# Patient Record
Sex: MALE | Race: WHITE | NOT HISPANIC OR LATINO
[De-identification: names, ages, dates, MRNs, and addresses within clinical notes are randomized per-mention and may not be internally consistent; named-entity substitution may affect disease eponyms.]

---

## 2017-11-14 ENCOUNTER — APPOINTMENT (OUTPATIENT)
Dept: ORTHOPEDIC SURGERY | Facility: CLINIC | Age: 62
End: 2017-11-14
Payer: MEDICAID

## 2017-11-14 VITALS
SYSTOLIC BLOOD PRESSURE: 186 MMHG | DIASTOLIC BLOOD PRESSURE: 98 MMHG | BODY MASS INDEX: 40.82 KG/M2 | HEIGHT: 65 IN | HEART RATE: 111 BPM | WEIGHT: 245 LBS

## 2017-11-14 DIAGNOSIS — Z78.9 OTHER SPECIFIED HEALTH STATUS: ICD-10-CM

## 2017-11-14 DIAGNOSIS — Z87.39 PERSONAL HISTORY OF OTHER DISEASES OF THE MUSCULOSKELETAL SYSTEM AND CONNECTIVE TISSUE: ICD-10-CM

## 2017-11-14 DIAGNOSIS — Z80.41 FAMILY HISTORY OF MALIGNANT NEOPLASM OF OVARY: ICD-10-CM

## 2017-11-14 DIAGNOSIS — Z80.2 FAMILY HISTORY OF MALIGNANT NEOPLASM OF OTHER RESPIRATORY AND INTRATHORACIC ORGANS: ICD-10-CM

## 2017-11-14 DIAGNOSIS — Z56.0 UNEMPLOYMENT, UNSPECIFIED: ICD-10-CM

## 2017-11-14 DIAGNOSIS — Z87.442 PERSONAL HISTORY OF URINARY CALCULI: ICD-10-CM

## 2017-11-14 DIAGNOSIS — Z86.79 PERSONAL HISTORY OF OTHER DISEASES OF THE CIRCULATORY SYSTEM: ICD-10-CM

## 2017-11-14 DIAGNOSIS — Z80.3 FAMILY HISTORY OF MALIGNANT NEOPLASM OF BREAST: ICD-10-CM

## 2017-11-14 PROCEDURE — 73030 X-RAY EXAM OF SHOULDER: CPT | Mod: RT

## 2017-11-14 PROCEDURE — 20610 DRAIN/INJ JOINT/BURSA W/O US: CPT | Mod: RT

## 2017-11-14 PROCEDURE — 99204 OFFICE O/P NEW MOD 45 MIN: CPT | Mod: 25

## 2017-11-14 RX ORDER — GABAPENTIN 300 MG
300 TABLET ORAL
Refills: 0 | Status: ACTIVE | COMMUNITY

## 2017-11-14 SDOH — ECONOMIC STABILITY - INCOME SECURITY: UNEMPLOYMENT, UNSPECIFIED: Z56.0

## 2019-02-25 ENCOUNTER — APPOINTMENT (OUTPATIENT)
Dept: UROLOGY | Facility: CLINIC | Age: 64
End: 2019-02-25
Payer: MEDICAID

## 2019-02-25 VITALS
BODY MASS INDEX: 42.49 KG/M2 | SYSTOLIC BLOOD PRESSURE: 130 MMHG | HEIGHT: 65 IN | HEART RATE: 108 BPM | DIASTOLIC BLOOD PRESSURE: 86 MMHG | TEMPERATURE: 98.2 F | WEIGHT: 255 LBS | OXYGEN SATURATION: 98 % | RESPIRATION RATE: 16 BRPM

## 2019-02-25 DIAGNOSIS — M75.41 IMPINGEMENT SYNDROME OF RIGHT SHOULDER: ICD-10-CM

## 2019-02-25 PROCEDURE — 99214 OFFICE O/P EST MOD 30 MIN: CPT

## 2019-02-25 RX ORDER — LEVOTHYROXINE SODIUM 0.03 MG/1
25 TABLET ORAL
Refills: 0 | Status: DISCONTINUED | COMMUNITY
End: 2019-02-25

## 2019-02-25 RX ORDER — LOSARTAN POTASSIUM 50 MG/1
50 TABLET, FILM COATED ORAL
Refills: 0 | Status: ACTIVE | COMMUNITY

## 2019-02-25 RX ORDER — LEVOTHYROXINE SODIUM 25 UG/1
25 TABLET ORAL
Refills: 0 | Status: ACTIVE | COMMUNITY

## 2019-02-25 NOTE — LETTER BODY
[Dear  ___] : Dear  [unfilled], [Consult Letter:] : I had the pleasure of evaluating your patient, [unfilled]. [Please see my note below.] : Please see my note below. [FreeTextEntry1] : \par \par Address: [address], [place] [address]\par \par \par \par \par Phone: [phone]

## 2019-02-25 NOTE — ASSESSMENT
[FreeTextEntry1] : PSA level was normal. His urinary symptoms are not significant enough at this time to consider alpha blocker therapy. He will undergo followup renal ultrasound to kidney stones. In 2016, 24-hour urine collection results were quite satisfactory and hopefully he has continued to the same diet. Renal stone diet was discussed again. He can followup in one year.\par \par Mikel Dockery MD, FACS\par [facility]\par  of Urology\par \par [address], [address]\[place] [address]\par \par [address], [address]\[place] [address]\par \par Tel: [phone]\par Fax: [phone]

## 2019-02-25 NOTE — HISTORY OF PRESENT ILLNESS
[FreeTextEntry1] : He is a 63 year-old man who is seen today in followup. He was last seen in 2016 for kidney stones. He does not have significant nocturia. Urinary flow is slow sometimes. Erectile function is intermittent but he is not bothered by it. Residual urine today was 40 cc. In December 2018, PSA level was 2.1. Ultrasound in July 2016 did not show any remaining kidney stones. Also in 2016, he underwent metabolic workup with 24-hour urine collection which showed urine volume 2.57 L, calcium 129, oxalate 49, citrate 456, urinary pH 6.4 and uric acid 0.68, the majority of which are normal results.\par Previous notes:He underwent right shock wave lithotripsy in April 2016 and stent placement for a radiolucent stone, 1.2 cm. Stent has been removed. He stopped taking potassium citrate.\par CT on 10/2015 showed a 7 mm stone in the right renal pelvis just above the right UPJ. There were no other stones. There was a 9 mm cyst in the upper pole of the left kidney. Since stone was not seen on KUB, he was placed on potassium citrate.

## 2019-02-25 NOTE — PHYSICAL EXAM
[General Appearance - Well Developed] : well developed [General Appearance - Well Nourished] : well nourished [Normal Appearance] : normal appearance [Well Groomed] : well groomed [General Appearance - In No Acute Distress] : no acute distress [Abdomen Soft] : soft [Abdomen Tenderness] : non-tender [Costovertebral Angle Tenderness] : no ~M costovertebral angle tenderness [FreeTextEntry1] : obese [Urethral Meatus] : meatus normal [Penis Abnormality] : normal circumcised penis [Urinary Bladder Findings] : the bladder was normal on palpation [Scrotum] : the scrotum was normal [Epididymis] : the epididymides were normal [Testes Tenderness] : no tenderness of the testes [Testes Mass (___cm)] : there were no testicular masses [Prostate Enlargement] : the prostate was not enlarged [Prostate Tenderness] : the prostate was not tender [No Prostate Nodules] : no prostate nodules [] : no respiratory distress [Respiration, Rhythm And Depth] : normal respiratory rhythm and effort [Exaggerated Use Of Accessory Muscles For Inspiration] : no accessory muscle use [Oriented To Time, Place, And Person] : oriented to person, place, and time [Affect] : the affect was normal [Mood] : the mood was normal [Not Anxious] : not anxious

## 2019-02-27 ENCOUNTER — FORM ENCOUNTER (OUTPATIENT)
Age: 64
End: 2019-02-27

## 2019-02-28 ENCOUNTER — OUTPATIENT (OUTPATIENT)
Dept: OUTPATIENT SERVICES | Facility: HOSPITAL | Age: 64
LOS: 1 days | End: 2019-02-28
Payer: MEDICAID

## 2019-02-28 ENCOUNTER — APPOINTMENT (OUTPATIENT)
Dept: ULTRASOUND IMAGING | Facility: CLINIC | Age: 64
End: 2019-02-28
Payer: MEDICAID

## 2019-02-28 DIAGNOSIS — Z98.89 OTHER SPECIFIED POSTPROCEDURAL STATES: Chronic | ICD-10-CM

## 2019-02-28 DIAGNOSIS — Z00.8 ENCOUNTER FOR OTHER GENERAL EXAMINATION: ICD-10-CM

## 2019-02-28 DIAGNOSIS — N20.0 CALCULUS OF KIDNEY: ICD-10-CM

## 2019-02-28 PROCEDURE — 76775 US EXAM ABDO BACK WALL LIM: CPT | Mod: 26

## 2019-02-28 PROCEDURE — 76775 US EXAM ABDO BACK WALL LIM: CPT

## 2019-02-28 PROCEDURE — 76770 US EXAM ABDO BACK WALL COMP: CPT | Mod: 26

## 2021-04-28 ENCOUNTER — APPOINTMENT (OUTPATIENT)
Dept: UROLOGY | Facility: CLINIC | Age: 66
End: 2021-04-28
Payer: MEDICARE

## 2021-04-28 VITALS
BODY MASS INDEX: 42.49 KG/M2 | WEIGHT: 255 LBS | SYSTOLIC BLOOD PRESSURE: 162 MMHG | HEIGHT: 65 IN | TEMPERATURE: 98.1 F | DIASTOLIC BLOOD PRESSURE: 73 MMHG | HEART RATE: 77 BPM | RESPIRATION RATE: 15 BRPM | OXYGEN SATURATION: 96 %

## 2021-04-28 PROCEDURE — 99072 ADDL SUPL MATRL&STAF TM PHE: CPT

## 2021-04-28 PROCEDURE — 99213 OFFICE O/P EST LOW 20 MIN: CPT

## 2021-04-28 NOTE — PHYSICAL EXAM
[General Appearance - Well Developed] : well developed [General Appearance - Well Nourished] : well nourished [Normal Appearance] : normal appearance [Well Groomed] : well groomed [General Appearance - In No Acute Distress] : no acute distress [Abdomen Soft] : soft [Abdomen Tenderness] : non-tender [Costovertebral Angle Tenderness] : no ~M costovertebral angle tenderness [Urethral Meatus] : meatus normal [Penis Abnormality] : normal circumcised penis [Urinary Bladder Findings] : the bladder was normal on palpation [Scrotum] : the scrotum was normal [Epididymis] : the epididymides were normal [Testes Tenderness] : no tenderness of the testes [Testes Mass (___cm)] : there were no testicular masses [Prostate Tenderness] : the prostate was not tender [Prostate Enlargement] : the prostate was not enlarged [No Prostate Nodules] : no prostate nodules

## 2021-04-28 NOTE — LETTER BODY
[Dear  ___] : Dear  [unfilled], [Please see my note below.] : Please see my note below. [Consult Letter:] : I had the pleasure of evaluating your patient, [unfilled]. [FreeTextEntry1] : Address: [address], [place] [address]\par Phone: [phone]

## 2021-04-28 NOTE — ASSESSMENT
[FreeTextEntry1] : PSA level is within normal range.  He is not significantly bothered by urinary symptoms.  Residual urine volume was minimal.  Recommended to have a repeat ultrasound but patient says that he will be seeing nephrology soon who will probably perform his renal ultrasound.  He will try to send me copy of it.  He can follow-up in 1 year.\par \par Mikel Dockery MD, FACS\par [facility]\par  of Urology\par \par [address], [address]\[place] [address]\par \par [address], [address]\[place] [address]\par \par Tel: [phone]\par Fax: [phone]

## 2021-04-28 NOTE — HISTORY OF PRESENT ILLNESS
[FreeTextEntry1] : He is a 65 year-old man who is seen today in followup.  He was seen about 2 years ago.  He generally does not have significant urinary symptoms.  Nocturia is 1 time.  There is no flank pain.  Ultrasound in 2019 showed no recurrent kidney stones.  Residual urine today was 50 cc.  PSA level was 2.3 in April 2021.  He is supposed to see nephrology soon.  He only has a slow urinary stream in the morning.\par \par Erectile function is intermittent but he is not bothered by it. In December 2018, PSA level was 2.1. Ultrasound in July 2016 did not show any remaining kidney stones. Also in 2016, he underwent metabolic workup with 24-hour urine collection which showed urine volume 2.57 L, calcium 129, oxalate 49, citrate 456, urinary pH 6.4 and uric acid 0.68, the majority of which are normal results.\par Previous notes: He underwent right shock wave lithotripsy in April 2016 and stent placement for a radiolucent stone, 1.2 cm. Stent has been removed. He stopped taking potassium citrate.\par CT on 10/2015 showed a 7 mm stone in the right renal pelvis just above the right UPJ. There were no other stones. There was a 9 mm cyst in the upper pole of the left kidney. Since stone was not seen on KUB, he was placed on potassium citrate.

## 2021-07-01 ENCOUNTER — NON-APPOINTMENT (OUTPATIENT)
Age: 66
End: 2021-07-01

## 2021-07-07 ENCOUNTER — OUTPATIENT (OUTPATIENT)
Dept: OUTPATIENT SERVICES | Facility: HOSPITAL | Age: 66
LOS: 1 days | End: 2021-07-07
Payer: MEDICARE

## 2021-07-07 VITALS
DIASTOLIC BLOOD PRESSURE: 75 MMHG | TEMPERATURE: 98 F | HEART RATE: 66 BPM | SYSTOLIC BLOOD PRESSURE: 164 MMHG | RESPIRATION RATE: 16 BRPM | WEIGHT: 255.07 LBS | OXYGEN SATURATION: 96 % | HEIGHT: 65 IN

## 2021-07-07 VITALS
HEART RATE: 66 BPM | SYSTOLIC BLOOD PRESSURE: 113 MMHG | OXYGEN SATURATION: 98 % | DIASTOLIC BLOOD PRESSURE: 52 MMHG | RESPIRATION RATE: 28 BRPM

## 2021-07-07 DIAGNOSIS — Z98.89 OTHER SPECIFIED POSTPROCEDURAL STATES: Chronic | ICD-10-CM

## 2021-07-07 DIAGNOSIS — I20.0 UNSTABLE ANGINA: ICD-10-CM

## 2021-07-07 LAB
ANION GAP SERPL CALC-SCNC: 15 MMOL/L — SIGNIFICANT CHANGE UP (ref 5–17)
BUN SERPL-MCNC: 16 MG/DL — SIGNIFICANT CHANGE UP (ref 7–23)
CALCIUM SERPL-MCNC: 9.4 MG/DL — SIGNIFICANT CHANGE UP (ref 8.4–10.5)
CHLORIDE SERPL-SCNC: 103 MMOL/L — SIGNIFICANT CHANGE UP (ref 96–108)
CO2 SERPL-SCNC: 20 MMOL/L — LOW (ref 22–31)
CREAT SERPL-MCNC: 1.32 MG/DL — HIGH (ref 0.5–1.3)
GLUCOSE BLDC GLUCOMTR-MCNC: 89 MG/DL — SIGNIFICANT CHANGE UP (ref 70–99)
GLUCOSE SERPL-MCNC: 112 MG/DL — HIGH (ref 70–99)
HCT VFR BLD CALC: 42.9 % — SIGNIFICANT CHANGE UP (ref 39–50)
HGB BLD-MCNC: 13.8 G/DL — SIGNIFICANT CHANGE UP (ref 13–17)
MCHC RBC-ENTMCNC: 30.1 PG — SIGNIFICANT CHANGE UP (ref 27–34)
MCHC RBC-ENTMCNC: 32.2 GM/DL — SIGNIFICANT CHANGE UP (ref 32–36)
MCV RBC AUTO: 93.5 FL — SIGNIFICANT CHANGE UP (ref 80–100)
NRBC # BLD: 0 /100 WBCS — SIGNIFICANT CHANGE UP (ref 0–0)
PLATELET # BLD AUTO: 217 K/UL — SIGNIFICANT CHANGE UP (ref 150–400)
POTASSIUM SERPL-MCNC: 4.2 MMOL/L — SIGNIFICANT CHANGE UP (ref 3.5–5.3)
POTASSIUM SERPL-SCNC: 4.2 MMOL/L — SIGNIFICANT CHANGE UP (ref 3.5–5.3)
RBC # BLD: 4.59 M/UL — SIGNIFICANT CHANGE UP (ref 4.2–5.8)
RBC # FLD: 13.1 % — SIGNIFICANT CHANGE UP (ref 10.3–14.5)
SODIUM SERPL-SCNC: 138 MMOL/L — SIGNIFICANT CHANGE UP (ref 135–145)
WBC # BLD: 9.78 K/UL — SIGNIFICANT CHANGE UP (ref 3.8–10.5)
WBC # FLD AUTO: 9.78 K/UL — SIGNIFICANT CHANGE UP (ref 3.8–10.5)

## 2021-07-07 PROCEDURE — C1887: CPT

## 2021-07-07 PROCEDURE — C1753: CPT

## 2021-07-07 PROCEDURE — 99152 MOD SED SAME PHYS/QHP 5/>YRS: CPT

## 2021-07-07 PROCEDURE — 99153 MOD SED SAME PHYS/QHP EA: CPT

## 2021-07-07 PROCEDURE — C1725: CPT

## 2021-07-07 PROCEDURE — C9600: CPT | Mod: LD

## 2021-07-07 PROCEDURE — 93005 ELECTROCARDIOGRAM TRACING: CPT

## 2021-07-07 PROCEDURE — 93010 ELECTROCARDIOGRAM REPORT: CPT

## 2021-07-07 PROCEDURE — 80048 BASIC METABOLIC PNL TOTAL CA: CPT

## 2021-07-07 PROCEDURE — 82962 GLUCOSE BLOOD TEST: CPT

## 2021-07-07 PROCEDURE — 93010 ELECTROCARDIOGRAM REPORT: CPT | Mod: 77

## 2021-07-07 PROCEDURE — 92978 ENDOLUMINL IVUS OCT C 1ST: CPT | Mod: 26,LD

## 2021-07-07 PROCEDURE — 85027 COMPLETE CBC AUTOMATED: CPT

## 2021-07-07 PROCEDURE — C1874: CPT

## 2021-07-07 PROCEDURE — C1769: CPT

## 2021-07-07 PROCEDURE — 92928 PRQ TCAT PLMT NTRAC ST 1 LES: CPT | Mod: LD

## 2021-07-07 PROCEDURE — 93458 L HRT ARTERY/VENTRICLE ANGIO: CPT | Mod: 59

## 2021-07-07 PROCEDURE — C1894: CPT

## 2021-07-07 PROCEDURE — 92978 ENDOLUMINL IVUS OCT C 1ST: CPT | Mod: LD

## 2021-07-07 PROCEDURE — 93458 L HRT ARTERY/VENTRICLE ANGIO: CPT | Mod: 26,59

## 2021-07-07 RX ORDER — SODIUM CHLORIDE 9 MG/ML
1000 INJECTION INTRAMUSCULAR; INTRAVENOUS; SUBCUTANEOUS
Refills: 0 | Status: DISCONTINUED | OUTPATIENT
Start: 2021-07-07 | End: 2021-07-21

## 2021-07-07 RX ORDER — CLOPIDOGREL BISULFATE 75 MG/1
1 TABLET, FILM COATED ORAL
Qty: 90 | Refills: 3
Start: 2021-07-07 | End: 2022-07-01

## 2021-07-07 RX ORDER — GLUCAGON INJECTION, SOLUTION 0.5 MG/.1ML
1 INJECTION, SOLUTION SUBCUTANEOUS ONCE
Refills: 0 | Status: DISCONTINUED | OUTPATIENT
Start: 2021-07-07 | End: 2021-07-21

## 2021-07-07 RX ORDER — DEXTROSE 50 % IN WATER 50 %
12.5 SYRINGE (ML) INTRAVENOUS ONCE
Refills: 0 | Status: DISCONTINUED | OUTPATIENT
Start: 2021-07-07 | End: 2021-07-21

## 2021-07-07 RX ORDER — FLUTICASONE PROPIONATE 50 MCG
1 SPRAY, SUSPENSION NASAL
Refills: 0 | Status: DISCONTINUED | OUTPATIENT
Start: 2021-07-07 | End: 2021-07-07

## 2021-07-07 RX ORDER — NEBIVOLOL HYDROCHLORIDE 5 MG/1
1 TABLET ORAL
Qty: 0 | Refills: 0 | DISCHARGE

## 2021-07-07 RX ORDER — DEXTROSE 50 % IN WATER 50 %
25 SYRINGE (ML) INTRAVENOUS ONCE
Refills: 0 | Status: DISCONTINUED | OUTPATIENT
Start: 2021-07-07 | End: 2021-07-21

## 2021-07-07 RX ORDER — INSULIN LISPRO 100/ML
VIAL (ML) SUBCUTANEOUS AT BEDTIME
Refills: 0 | Status: DISCONTINUED | OUTPATIENT
Start: 2021-07-07 | End: 2021-07-21

## 2021-07-07 RX ORDER — CETIRIZINE HYDROCHLORIDE 10 MG/1
1 TABLET ORAL
Qty: 0 | Refills: 0 | DISCHARGE

## 2021-07-07 RX ORDER — LOSARTAN POTASSIUM 100 MG/1
50 TABLET, FILM COATED ORAL DAILY
Refills: 0 | Status: DISCONTINUED | OUTPATIENT
Start: 2021-07-07 | End: 2021-07-07

## 2021-07-07 RX ORDER — SIMVASTATIN 20 MG/1
1 TABLET, FILM COATED ORAL
Qty: 0 | Refills: 0 | DISCHARGE

## 2021-07-07 RX ORDER — BISOPROLOL FUMARATE 10 MG/1
1 TABLET, FILM COATED ORAL
Qty: 0 | Refills: 0 | DISCHARGE

## 2021-07-07 RX ORDER — LORATADINE 10 MG/1
10 TABLET ORAL DAILY
Refills: 0 | Status: DISCONTINUED | OUTPATIENT
Start: 2021-07-07 | End: 2021-07-07

## 2021-07-07 RX ORDER — CHOLECALCIFEROL (VITAMIN D3) 125 MCG
1000 CAPSULE ORAL DAILY
Refills: 0 | Status: DISCONTINUED | OUTPATIENT
Start: 2021-07-07 | End: 2021-07-07

## 2021-07-07 RX ORDER — GABAPENTIN 400 MG/1
1 CAPSULE ORAL
Qty: 0 | Refills: 0 | DISCHARGE

## 2021-07-07 RX ORDER — SIMVASTATIN 20 MG/1
40 TABLET, FILM COATED ORAL AT BEDTIME
Refills: 0 | Status: DISCONTINUED | OUTPATIENT
Start: 2021-07-07 | End: 2021-07-07

## 2021-07-07 RX ORDER — METOPROLOL TARTRATE 50 MG
100 TABLET ORAL DAILY
Refills: 0 | Status: DISCONTINUED | OUTPATIENT
Start: 2021-07-07 | End: 2021-07-07

## 2021-07-07 RX ORDER — LEVOTHYROXINE SODIUM 125 MCG
25 TABLET ORAL DAILY
Refills: 0 | Status: DISCONTINUED | OUTPATIENT
Start: 2021-07-07 | End: 2021-07-07

## 2021-07-07 RX ORDER — METFORMIN HYDROCHLORIDE 850 MG/1
1 TABLET ORAL
Qty: 0 | Refills: 0 | DISCHARGE

## 2021-07-07 RX ORDER — FLUTICASONE PROPIONATE 50 MCG
1 SPRAY, SUSPENSION NASAL
Qty: 0 | Refills: 0 | DISCHARGE

## 2021-07-07 RX ORDER — GABAPENTIN 400 MG/1
600 CAPSULE ORAL DAILY
Refills: 0 | Status: DISCONTINUED | OUTPATIENT
Start: 2021-07-07 | End: 2021-07-07

## 2021-07-07 RX ORDER — METFORMIN HYDROCHLORIDE 850 MG/1
500 TABLET ORAL
Refills: 0 | Status: DISCONTINUED | OUTPATIENT
Start: 2021-07-07 | End: 2021-07-07

## 2021-07-07 RX ORDER — SODIUM CHLORIDE 9 MG/ML
1000 INJECTION, SOLUTION INTRAVENOUS
Refills: 0 | Status: DISCONTINUED | OUTPATIENT
Start: 2021-07-07 | End: 2021-07-21

## 2021-07-07 RX ORDER — INSULIN LISPRO 100/ML
VIAL (ML) SUBCUTANEOUS
Refills: 0 | Status: DISCONTINUED | OUTPATIENT
Start: 2021-07-07 | End: 2021-07-21

## 2021-07-07 RX ORDER — ASPIRIN/CALCIUM CARB/MAGNESIUM 324 MG
1 TABLET ORAL
Qty: 90 | Refills: 3
Start: 2021-07-07 | End: 2022-07-01

## 2021-07-07 RX ORDER — DEXTROSE 50 % IN WATER 50 %
15 SYRINGE (ML) INTRAVENOUS ONCE
Refills: 0 | Status: DISCONTINUED | OUTPATIENT
Start: 2021-07-07 | End: 2021-07-21

## 2021-07-07 RX ORDER — SODIUM CHLORIDE 9 MG/ML
250 INJECTION INTRAMUSCULAR; INTRAVENOUS; SUBCUTANEOUS ONCE
Refills: 0 | Status: DISCONTINUED | OUTPATIENT
Start: 2021-07-07 | End: 2021-07-21

## 2021-07-07 RX ORDER — LOSARTAN POTASSIUM 100 MG/1
2 TABLET, FILM COATED ORAL
Qty: 0 | Refills: 0 | DISCHARGE

## 2021-07-07 RX ORDER — LEVOTHYROXINE SODIUM 125 MCG
1 TABLET ORAL
Qty: 0 | Refills: 0 | DISCHARGE

## 2021-07-07 RX ORDER — SODIUM CHLORIDE 9 MG/ML
500 INJECTION INTRAMUSCULAR; INTRAVENOUS; SUBCUTANEOUS
Refills: 0 | Status: DISCONTINUED | OUTPATIENT
Start: 2021-07-07 | End: 2021-07-07

## 2021-07-07 RX ADMIN — SODIUM CHLORIDE 75 MILLILITER(S): 9 INJECTION INTRAMUSCULAR; INTRAVENOUS; SUBCUTANEOUS at 12:19

## 2021-07-07 NOTE — H&P CARDIOLOGY - FAMILY HISTORY
Mother  Still living? No  Family history of ovarian cancer, Age at diagnosis: Age Unknown     Father  Still living? No  Family history of renal cancer, Age at diagnosis: Age Unknown     Sibling  Still living? No  Family history of breast cancer, Age at diagnosis: Age Unknown

## 2021-07-07 NOTE — H&P CARDIOLOGY - PMH
Anxiety    Diverticulosis    Hiatal hernia    HLD (hyperlipidemia)    HTN (hypertension)    Hypertension    Kidney calculus    Lumbar herniated disc    Sleep apnea  was evaluated about 7 years ago, non compliant with CPAP

## 2021-07-07 NOTE — H&P CARDIOLOGY - HISTORY OF PRESENT ILLNESS
This is a 65yr old  male with no known implantable devices , COVID 19 negative vaccinated with Pfizer 4/21/21 . PMHX of HLD,  HTN, prediabetes on Metformin Hgb AIC 6.1 , Sleep apnea not using CPAP, Diverticulosis, Hiatal hernia and right kidney stone. Pt presents with occasional episodes of mild to moderate chest discomfort. Began several months ago while shoveling snow Band like pressure radiating to left arm and across chest. Relieved with rest . Pt was seen by Cardiologist Dr. Paula Mcghee and had Echocardiogram that revealed Impaired relaxation , mild hypokinesis anterior mid septum with EF 80% normal LV size and function. Presents today for [facility] with Dr. Garcia . Currently Cp free no sob no palpitations no lightheadedness or dizziness noted.     Pt States has Intolerance to ASA " I get Tinnitus" .  This is a 65yr old  male with no known implantable devices ,COVID 19 negative vaccinated with Pfizer 4/21/21 . PMHX of HLD,  HTN, prediabetes on Metformin Hgb AIC 6.1 , Sleep apnea not using CPAP, Diverticulosis, Hiatal hernia and right kidney stone. Pt presents with occasional episodes of mild to moderate chest discomfort. Began several months ago while shoveling snow Band like pressure radiating to left arm and across chest. Relieved with rest . Pt was seen by Cardiologist Dr. Paula Mcghee and had Echocardiogram that revealed Impaired relaxation , mild hypokinesis anterior mid septum with EF 80% normal LV size and function. Presents today for [facility] with Dr. Garcia . Currently Cp free no sob no palpitations no lightheadedness or dizziness noted.     Pt States has Intolerance to ASA " I get Tinnitus" .

## 2021-07-07 NOTE — ASU DISCHARGE PLAN (ADULT/PEDIATRIC) - CARE PROVIDER_API CALL
MAE LANGLEY  Cardiology  [address] [place] [address]  Phone: [phone]  Fax: [phone]  Established Patient  Follow Up Time: 2 weeks

## 2021-07-07 NOTE — ASU DISCHARGE PLAN (ADULT/PEDIATRIC) - ASU DC SPECIAL INSTRUCTIONSFT

## 2021-07-07 NOTE — H&P CARDIOLOGY - PSH
S/P colonoscopy  2013  S/P nasal polypectomy  2008  Status post left foot surgery  11/30/15 with hardware, due to injury

## 2022-03-27 PROBLEM — E78.5 HYPERLIPIDEMIA, UNSPECIFIED: Chronic | Status: ACTIVE | Noted: 2021-07-07

## 2022-03-27 PROBLEM — I10 ESSENTIAL (PRIMARY) HYPERTENSION: Chronic | Status: ACTIVE | Noted: 2021-07-07

## 2022-04-27 ENCOUNTER — APPOINTMENT (OUTPATIENT)
Dept: UROLOGY | Facility: CLINIC | Age: 67
End: 2022-04-27
Payer: MEDICARE

## 2022-04-27 VITALS
SYSTOLIC BLOOD PRESSURE: 130 MMHG | DIASTOLIC BLOOD PRESSURE: 64 MMHG | BODY MASS INDEX: 40.98 KG/M2 | WEIGHT: 246 LBS | TEMPERATURE: 98.1 F | HEIGHT: 65 IN

## 2022-04-27 DIAGNOSIS — R39.198 OTHER DIFFICULTIES WITH MICTURITION: ICD-10-CM

## 2022-04-27 PROCEDURE — 99213 OFFICE O/P EST LOW 20 MIN: CPT

## 2022-04-27 RX ORDER — BISOPROLOL FUMARATE 5 MG/1
5 TABLET, FILM COATED ORAL
Refills: 0 | Status: ACTIVE | COMMUNITY

## 2022-04-27 RX ORDER — CETIRIZINE HCL 10 MG
10 TABLET ORAL
Refills: 0 | Status: ACTIVE | COMMUNITY

## 2022-04-27 RX ORDER — SIMVASTATIN 40 MG/1
40 TABLET, FILM COATED ORAL
Refills: 0 | Status: ACTIVE | COMMUNITY

## 2022-04-27 RX ORDER — METFORMIN HYDROCHLORIDE 500 MG/1
500 TABLET, COATED ORAL
Refills: 0 | Status: ACTIVE | COMMUNITY

## 2022-04-27 RX ORDER — ASPIRIN 81 MG
81 TABLET, DELAYED RELEASE (ENTERIC COATED) ORAL
Refills: 0 | Status: ACTIVE | COMMUNITY

## 2022-04-27 RX ORDER — FLUTICASONE PROPIONATE 50 UG/1
50 SPRAY, METERED NASAL
Refills: 0 | Status: ACTIVE | COMMUNITY

## 2022-04-27 NOTE — PHYSICAL EXAM
[General Appearance - Well Developed] : well developed [General Appearance - Well Nourished] : well nourished [Normal Appearance] : normal appearance [Well Groomed] : well groomed [General Appearance - In No Acute Distress] : no acute distress [Abdomen Soft] : soft [Abdomen Tenderness] : non-tender [Costovertebral Angle Tenderness] : no ~M costovertebral angle tenderness [Urethral Meatus] : meatus normal [Penis Abnormality] : normal circumcised penis [Urinary Bladder Findings] : the bladder was normal on palpation [Scrotum] : the scrotum was normal [Epididymis] : the epididymides were normal [Testes Tenderness] : no tenderness of the testes [Testes Mass (___cm)] : there were no testicular masses [Prostate Enlargement] : the prostate was not enlarged [Prostate Tenderness] : the prostate was not tender [No Prostate Nodules] : no prostate nodules [FreeTextEntry1] : Prostate examined in 2021 [] : no respiratory distress [Respiration, Rhythm And Depth] : normal respiratory rhythm and effort [Exaggerated Use Of Accessory Muscles For Inspiration] : no accessory muscle use [Oriented To Time, Place, And Person] : oriented to person, place, and time [Affect] : the affect was normal [Mood] : the mood was normal [Not Anxious] : not anxious

## 2022-04-27 NOTE — LETTER BODY
[Dear  ___] : Dear  [unfilled], [Consult Letter:] : I had the pleasure of evaluating your patient, [unfilled]. [Please see my note below.] : Please see my note below. [FreeTextEntry1] : Address: [address], [place] [address]\par Phone: [phone]

## 2022-04-27 NOTE — HISTORY OF PRESENT ILLNESS
[FreeTextEntry1] : He is a 66 year-old man who is seen today in followup for urinary symptoms.  Since last visit he underwent placement of a single coronary artery stent.  He had a short episode of hematospermia which has resolved.  He also follows with nephrology.  Urinary symptoms are stable with nocturia 1 or 2 times.  He does not complain of significant erectile dysfunction.  He is due for repeat PSA level.  Residual urine volume today was only about 30 cc.\par Ultrasound in 2019 showed no recurrent kidney stones. PSA level was 2.3 in April 2021.   He has slow urinary stream in the morning.\par \par Previous notes: Ultrasound in July 2016 did not show any remaining kidney stones. Also in 2016, he underwent metabolic workup with 24-hour urine collection which showed urine volume 2.57 L, calcium 129, oxalate 49, citrate 456, urinary pH 6.4 and uric acid 0.68, the majority of which are normal results.\par He underwent right shock wave lithotripsy in April 2016 and stent placement for a radiolucent stone, 1.2 cm. He stopped taking potassium citrate.\par CT on 10/2015 showed a 7 mm stone in the right renal pelvis just above the right UPJ. There were no other stones. There was a 9 mm cyst in the upper pole of the left kidney. Since stone was not seen on KUB, he was placed on potassium citrate.

## 2022-04-27 NOTE — ASSESSMENT
[FreeTextEntry1] : Residual urine volume is minimal.  He is not bothered by urinary symptoms.  He will undergo repeat PSA testing at an outside lab.  He will send us a copy of the PSA level.  Ultrasound previously did not show any kidney stones and he is not symptomatic.  He can follow-up in 1 year.\par \par Mikel Dockery MD, FACS\par [facility]\par  of Urology\par \par [address], [address]\[place] [address]\par \par [address], [address]\[place] [address]\par \par Tel: [phone]\par Fax: [phone]

## 2022-05-02 ENCOUNTER — NON-APPOINTMENT (OUTPATIENT)
Age: 67
End: 2022-05-02

## 2022-05-13 ENCOUNTER — APPOINTMENT (OUTPATIENT)
Dept: ORTHOPEDIC SURGERY | Facility: CLINIC | Age: 67
End: 2022-05-13
Payer: OTHER MISCELLANEOUS

## 2022-05-13 VITALS — BODY MASS INDEX: 40.98 KG/M2 | HEIGHT: 65 IN | WEIGHT: 246 LBS

## 2022-05-13 PROCEDURE — 99213 OFFICE O/P EST LOW 20 MIN: CPT

## 2022-05-13 PROCEDURE — 99072 ADDL SUPL MATRL&STAF TM PHE: CPT

## 2022-05-13 NOTE — PHYSICAL EXAM
[Bilateral] : foot and ankle bilaterally [] : no toe tenderness [FreeTextEntry3] : 3+ pitting edema L > R [de-identified] : numb toes [de-identified] : balance

## 2022-05-13 NOTE — HISTORY OF PRESENT ILLNESS
[Work related] : work related [Gradual] : gradual [6] : 6 [5] : 5 [Dull/Aching] : dull/aching [Localized] : localized [Rest] : rest [Physical therapy] : physical therapy [Walking] : walking [] : yes [Not working due to injury] : Work status: not working due to injury [de-identified] : Patient Complaint - WC DOI 8/6/14 \par 1/18/14: Here for f/u on the right foot injury which is a consequencial to the left foot fracture sustained at work. He\par has been doing HEP, he has been OOW.\par 4/3/18 f/u [facility] feet HEP, Pt not authorized R foot.\par 6/12/18: f/u B/L feet, HEP. PT is not authorized.\par 9/4/18 f/u [facility]. HEP, had PT for LS. Not working.\par 11/27/18: f/u bilateral feet L foot having sharp pains. plantar pain occasionally right foot. intermittent. using cane.\par taking gabapentin for the back. was better with PT. Disabled 65%\par 2/5/19: f/u bilateral feet continued pain continued sharp pains to the L foot. more plantar pain to the right foot.\par taking gabapentin for radicular complaints which has been helping. Was better with PT. oow\par 4/9/19 f/u [facility] feet Numbness persists L foot plantar pain R somewhat improved PT auth Not working\par 5/21/19 f/u R foot better w/ PT Not working\par 7/2/19: f/u b/l feet. PT/HEP. walking better. Pain to the ball of the right foot/achilles. Sharp pain lateral left foot. usin\par cane. not working\par 9/3/19 f/u [facility]. Plantar pain resolved HEP\par 11/26/19: f/u bilateral foot, continued numbness to the left foot and bilateral foot pain, awaiting auth for orthotics.\par using cane\par 2/4/20 f/u [facility] toenail issue L nail. Numbness persists Orthotics pending\par 5/12/20 f/u [facility] feet, symptoms persist, Orthotics pending\par 10/20/20 f/u [facility] Orthotics pending Not working\par 2/23/21: fu bilateral feet, at times stabbing pain to the left lateral foot. right bunion pain at times. not working\par 4/29/21 f/u [facility] feet occasional swelling Orthotics not yet approved Not working\par 8/3/21: f/u bilateral feet. using cane. stretching/hep. recent cardiac stent placement. on Plavix and asa. not working\par 11/2/21: fu bilateral feet, doing cardiac rehab. Continued numbness to the left foot. Right medial foot pain continues\par WB with cane. HEP. Hes taking gabapentin for the sciatic and low quantity oxycodone yearly. Not working.\par 2/15/22 f/u [facility] feet HEP/PT and cardia rehab\par 5/13/22  f/u [facility]  HEP  Cardiac Rehab [FreeTextEntry1] : bilateral feet  [FreeTextEntry3] : 08/06/2014 [FreeTextEntry5] : work injury  [de-identified] : physical therapy

## 2022-05-13 NOTE — WORK
[Partial] : partial [Cannot return to work because ________] : cannot return to work because [unfilled] [No Rx restrictions] : No Rx restrictions. [I provided the services listed above] :  I provided the services listed above. [FreeTextEntry1] : poor

## 2022-05-16 ENCOUNTER — APPOINTMENT (OUTPATIENT)
Dept: ORTHOPEDIC SURGERY | Facility: CLINIC | Age: 67
End: 2022-05-16
Payer: MEDICARE

## 2022-05-16 VITALS — WEIGHT: 246 LBS | HEIGHT: 65 IN | BODY MASS INDEX: 40.98 KG/M2

## 2022-05-16 DIAGNOSIS — U07.1 COVID-19: ICD-10-CM

## 2022-05-16 PROCEDURE — 99214 OFFICE O/P EST MOD 30 MIN: CPT

## 2022-05-16 PROCEDURE — 72050 X-RAY EXAM NECK SPINE 4/5VWS: CPT

## 2022-05-16 NOTE — HISTORY OF PRESENT ILLNESS
[Neck] : neck [8] : 8 [2] : 2 [Dull/Aching] : dull/aching [Localized] : localized [Sharp] : sharp [Leisure] : leisure [Massage] : massage [de-identified] : 5/16/22:  Pt states his neck has began to become sore and stiff while sitting since his last visit and would like the doctor to do check it out. RHD - no loss of fine motor \par \par Pain in the neck \par \par on blood thinners due to windowmaker last year\par Has been doing cardiac rehab - legs doing better with this\par stent\par \par has to sleep in chair for the back \par \par xrays today:\par C spine - C4-6 loss of disc height  [] : Post Surgical Visit: no [FreeTextEntry5] : MVI NONE [de-identified] : NONE

## 2022-05-16 NOTE — DISCUSSION/SUMMARY
[de-identified] : neck pain with radiation and spondylosis/ddd on the xray - discussion of conservative tx options - PT an option - script for PT - if not helping will get MRI

## 2022-05-27 ENCOUNTER — APPOINTMENT (OUTPATIENT)
Dept: ORTHOPEDIC SURGERY | Facility: CLINIC | Age: 67
End: 2022-05-27
Payer: OTHER MISCELLANEOUS

## 2022-05-27 VITALS — WEIGHT: 246 LBS | BODY MASS INDEX: 40.98 KG/M2 | HEIGHT: 65 IN

## 2022-05-27 PROCEDURE — 99214 OFFICE O/P EST MOD 30 MIN: CPT

## 2022-05-27 PROCEDURE — 99072 ADDL SUPL MATRL&STAF TM PHE: CPT

## 2022-05-27 NOTE — WORK
[Total] : total [Unknown at this time] : : unknown at this time [Patient] : patient [FreeTextEntry1] : poor.\par The patient cannot return to work

## 2022-05-27 NOTE — DISCUSSION/SUMMARY
[de-identified] : Timing and frequency of medication has been discussed with patient.\par I have consulted the  registry for the purpose of reviewing the patients controlled substance.\par \par \par No change in disability - continue HEP - renewed Percocet (yearly rx) - has gabapentin at home - fu in 3 months\par

## 2022-05-27 NOTE — HISTORY OF PRESENT ILLNESS
[Lower back] : lower back [6] : 6 [Dull/Aching] : dull/aching [Intermittent] : intermittent [Not working due to injury] : Work status: not working due to injury [de-identified] : WC 8/16/14\par \par 10-4-21- He remains at his baseline level of pain and dysfunction. continues to ambulate with a cane and requesting renewal on the gabapentin\par \par 5/27/22: Here for follow up - symptoms remain - he has been doing cardiac rehab - difficulty rising from a seated position - manages with gabapentin and occasional Percocet - oow [] : Post Surgical Visit: no [FreeTextEntry5] : here for follow up for lower back \par pain is still the same

## 2022-06-28 ENCOUNTER — NON-APPOINTMENT (OUTPATIENT)
Age: 67
End: 2022-06-28

## 2022-06-29 LAB
APPEARANCE: ABNORMAL
BACTERIA: NEGATIVE
BILIRUBIN URINE: NEGATIVE
BLOOD URINE: ABNORMAL
COLOR: YELLOW
GLUCOSE QUALITATIVE U: NEGATIVE
HYALINE CASTS: 1 /LPF
KETONES URINE: NEGATIVE
LEUKOCYTE ESTERASE URINE: NEGATIVE
MICROSCOPIC-UA: NORMAL
NITRITE URINE: NEGATIVE
PH URINE: 6
PROTEIN URINE: ABNORMAL
RED BLOOD CELLS URINE: 7 /HPF
SPECIFIC GRAVITY URINE: 1.02
SQUAMOUS EPITHELIAL CELLS: 0 /HPF
UROBILINOGEN URINE: NORMAL
WHITE BLOOD CELLS URINE: 3 /HPF

## 2022-06-30 LAB — BACTERIA UR CULT: NORMAL

## 2022-07-11 ENCOUNTER — APPOINTMENT (OUTPATIENT)
Dept: CT IMAGING | Facility: CLINIC | Age: 67
End: 2022-07-11

## 2022-07-11 ENCOUNTER — OUTPATIENT (OUTPATIENT)
Dept: OUTPATIENT SERVICES | Facility: HOSPITAL | Age: 67
LOS: 1 days | End: 2022-07-11
Payer: MEDICARE

## 2022-07-11 DIAGNOSIS — Z98.89 OTHER SPECIFIED POSTPROCEDURAL STATES: Chronic | ICD-10-CM

## 2022-07-11 DIAGNOSIS — Z00.8 ENCOUNTER FOR OTHER GENERAL EXAMINATION: ICD-10-CM

## 2022-07-11 DIAGNOSIS — R31.0 GROSS HEMATURIA: ICD-10-CM

## 2022-07-11 PROCEDURE — 74178 CT ABD&PLV WO CNTR FLWD CNTR: CPT

## 2022-07-11 PROCEDURE — 74178 CT ABD&PLV WO CNTR FLWD CNTR: CPT | Mod: 26

## 2022-07-12 ENCOUNTER — NON-APPOINTMENT (OUTPATIENT)
Age: 67
End: 2022-07-12

## 2022-07-14 ENCOUNTER — APPOINTMENT (OUTPATIENT)
Dept: UROLOGY | Facility: CLINIC | Age: 67
End: 2022-07-14

## 2022-07-14 ENCOUNTER — RX RENEWAL (OUTPATIENT)
Age: 67
End: 2022-07-14

## 2022-07-14 VITALS
WEIGHT: 246 LBS | HEIGHT: 65 IN | RESPIRATION RATE: 14 BRPM | OXYGEN SATURATION: 99 % | DIASTOLIC BLOOD PRESSURE: 74 MMHG | HEART RATE: 66 BPM | SYSTOLIC BLOOD PRESSURE: 153 MMHG | BODY MASS INDEX: 40.98 KG/M2 | TEMPERATURE: 97.8 F

## 2022-07-14 PROCEDURE — 52000 CYSTOURETHROSCOPY: CPT

## 2022-07-19 LAB — URINE CYTOLOGY: NORMAL

## 2022-08-26 ENCOUNTER — APPOINTMENT (OUTPATIENT)
Dept: ORTHOPEDIC SURGERY | Facility: CLINIC | Age: 67
End: 2022-08-26

## 2022-08-26 PROCEDURE — 99072 ADDL SUPL MATRL&STAF TM PHE: CPT

## 2022-08-26 PROCEDURE — 99214 OFFICE O/P EST MOD 30 MIN: CPT

## 2022-08-26 NOTE — REVIEW OF SYSTEMS
Continue Regimen: Clobetasol ointment- aaa body bid x 2 weeks, take 1 week break, repeat prn flares\\nHumira 40mg- inject 1 syringe SC every other week Detail Level: Simple [Joint Pain] : joint pain [Negative] : Heme/Lymph

## 2022-08-26 NOTE — DISCUSSION/SUMMARY
[de-identified] : Timing and frequency of medication has been discussed with patient.\par I have consulted the  registry for the purpose of reviewing the patients controlled substance.\par \par \par No change in disability - continue HEP -- has gabapentin and percocet at home - fu in 3 months\par

## 2022-08-26 NOTE — HISTORY OF PRESENT ILLNESS
[6] : 6 [Lower back] : lower back [Dull/Aching] : dull/aching [Intermittent] : intermittent [Not working due to injury] : Work status: not working due to injury [de-identified] : WC 8/16/14\par \par 10-4-21- He remains at his baseline level of pain and dysfunction. continues to ambulate with a cane and requesting renewal on the gabapentin\par \par 5/27/22: Here for follow up - symptoms remain - he has been doing cardiac rehab - difficulty rising from a seated position - manages with gabapentin and occasional Percocet - oow\par \par 8/26/22: Here for fu - plan at last was medication - on the cane - back pain remains  - rare use of medication percocet - using gabapentin at night - radiates down the legs as well - down to the feet\par \par had a kidney stone in meantime and had to recover from that \par had a tooth fracture and had to have a procedure from that \par this took him out of the cardiac rehab for a bit but hes back now \par  [] : Post Surgical Visit: no [FreeTextEntry1] : back  [FreeTextEntry5] : here for follow up for lower back \par pain is still the same  [de-identified] : cardiac rehab which includes lots of walking

## 2022-09-06 ENCOUNTER — APPOINTMENT (OUTPATIENT)
Dept: ORTHOPEDIC SURGERY | Facility: CLINIC | Age: 67
End: 2022-09-06

## 2022-09-16 ENCOUNTER — APPOINTMENT (OUTPATIENT)
Dept: ORTHOPEDIC SURGERY | Facility: CLINIC | Age: 67
End: 2022-09-16

## 2022-09-16 VITALS — WEIGHT: 243 LBS | HEIGHT: 65 IN | BODY MASS INDEX: 40.48 KG/M2

## 2022-09-16 DIAGNOSIS — M76.60 ACHILLES TENDINITIS, UNSPECIFIED LEG: ICD-10-CM

## 2022-09-16 DIAGNOSIS — I89.0 LYMPHEDEMA, NOT ELSEWHERE CLASSIFIED: ICD-10-CM

## 2022-09-16 PROCEDURE — 99213 OFFICE O/P EST LOW 20 MIN: CPT

## 2022-09-16 PROCEDURE — 99072 ADDL SUPL MATRL&STAF TM PHE: CPT

## 2022-09-16 NOTE — HISTORY OF PRESENT ILLNESS
[8] : 8 [7] : 7 [Retired] : Work status: retired [de-identified] : Patient Complaint - WC DOI 8/6/14 \par 1/18/14: Here for f/u on the right foot injury which is a consequencial to the left foot fracture sustained at work. He\par has been doing HEP, he has been OOW.\par 4/3/18 f/u [facility] HEP, Pt not authorized R foot.\par 6/12/18: f/u B/L feet, HEP. PT is not authorized.\par 9/4/18 f/u [facility] feet. HEP, had PT for LS. Not working.\par 11/27/18: f/u bilateral feet L foot having sharp pains. plantar pain occasionally right foot. intermittent. using cane.\par taking gabapentin for the back. was better with PT. Disabled 65%\par 2/5/19: f/u bilateral feet continued pain continued sharp pains to the L foot. more plantar pain to the right foot.\par taking gabapentin for radicular complaints which has been helping. Was better with PT. oow\par 4/9/19 f/u [facility] Numbness persists L foot plantar pain R somewhat improved PT auth Not working\par 5/21/19 f/u R foot better w/ PT Not working\par 7/2/19: f/u b/l feet. PT/HEP. walking better. Pain to the ball of the right foot/achilles. Sharp pain lateral left foot. usin\par cane. not working\par 9/3/19 f/u [facility]. Plantar pain resolved HEP\par 11/26/19: f/u bilateral foot, continued numbness to the left foot and bilateral foot pain, awaiting auth for orthotics.\par using cane\par 2/4/20 f/u [facility] toenail issue L nail. Numbness persists Orthotics pending\par 5/12/20 f/u [facility] feet, symptoms persist, Orthotics pending\par 10/20/20 f/u [facility] Orthotics pending Not working\par 2/23/21: fu bilateral feet, at times stabbing pain to the left lateral foot. right bunion pain at times. not working\par 4/29/21 f/u [facility] feet occasional swelling Orthotics not yet approved Not working\par 8/3/21: f/u bilateral feet. using cane. stretching/hep. recent cardiac stent placement. on Plavix and asa. not working\par 11/2/21: fu bilateral feet, doing cardiac rehab. Continued numbness to the left foot. Right medial foot pain continues\par WB with cane. HEP. Hes taking gabapentin for the sciatic and low quantity oxycodone yearly. Not working.\par 2/15/22 f/u [facility] HEP/PT and cardia rehab\par 5/13/22  f/u [facility] feet  HEP  Cardiac Rehab\par 9/16/22: f/u bilateral feet; continued pain; using the cane for balance  [] : Post Surgical Visit: no [FreeTextEntry1] : [facility]  [FreeTextEntry3] : 8/6/14 [FreeTextEntry5] : 65 Y/O M RHD M eval [facility] Feet WC DOI Above no noticeable change in condition since last visit  [FreeTextEntry6] : Numbness [de-identified] : Home Exercise  [de-identified] :

## 2022-09-16 NOTE — PHYSICAL EXAM
[Bilateral] : foot and ankle bilaterally [] : no toe tenderness [FreeTextEntry3] : 3+ pitting edema L > R [de-identified] : numb toes [de-identified] : balance

## 2022-10-19 ENCOUNTER — APPOINTMENT (OUTPATIENT)
Dept: UROLOGY | Facility: CLINIC | Age: 67
End: 2022-10-19

## 2022-10-19 DIAGNOSIS — Z87.448 PERSONAL HISTORY OF OTHER DISEASES OF URINARY SYSTEM: ICD-10-CM

## 2022-10-19 PROCEDURE — 99213 OFFICE O/P EST LOW 20 MIN: CPT

## 2022-10-19 RX ORDER — OXYCODONE HYDROCHLORIDE AND ACETAMINOPHEN 5; 325 MG/1; MG/1
5-325 TABLET ORAL
Refills: 0 | Status: DISCONTINUED | COMMUNITY
End: 2022-10-19

## 2022-10-19 RX ORDER — CLOPIDOGREL 75 MG/1
75 TABLET, FILM COATED ORAL
Refills: 0 | Status: DISCONTINUED | COMMUNITY
End: 2022-10-19

## 2022-10-19 NOTE — HISTORY OF PRESENT ILLNESS
[FreeTextEntry1] : He is a 66 year-old man who is seen today in followup for urinary symptoms.  He underwent gross hematuria work-up with cystoscopy and CT scan in July 2022.  Findings only showed a 3 mm left mid ureteral stone on CT scan which he believes he passed.  PSA level was 1.6 in April 2022.  Residual urine volume today was about 40 mL.  He is no longer on anticoagulation therapy after coronary stent placement.  Also hematospermia has resolved.  He continues to see nephrology.\par Urinary symptoms are stable with nocturia 1 or 2 times.  He does not complain of significant erectile dysfunction.  Ultrasound in 2019 showed no recurrent kidney stones. PSA level was 2.3 in April 2021.   \par \par Previous notes: Ultrasound in July 2016 did not show any remaining kidney stones. Also in 2016, he underwent metabolic workup with 24-hour urine collection which showed urine volume 2.57 L, calcium 129, oxalate 49, citrate 456, urinary pH 6.4 and uric acid 0.68, the majority of which are normal results.\par He underwent right shock wave lithotripsy in April 2016 and stent placement for a radiolucent stone, 1.2 cm. He stopped taking potassium citrate.\par CT on 10/2015 showed a 7 mm stone in the right renal pelvis just above the right UPJ. There were no other stones. There was a 9 mm cyst in the upper pole of the left kidney. Since stone was not seen on KUB, he was placed on potassium citrate.

## 2022-10-19 NOTE — PHYSICAL EXAM
[Urethral Meatus] : meatus normal [Penis Abnormality] : normal circumcised penis [Urinary Bladder Findings] : the bladder was normal on palpation [Scrotum] : the scrotum was normal [Epididymis] : the epididymides were normal [Testes Tenderness] : no tenderness of the testes [Testes Mass (___cm)] : there were no testicular masses [Prostate Enlargement] : the prostate was not enlarged [Prostate Tenderness] : the prostate was not tender [No Prostate Nodules] : no prostate nodules [General Appearance - Well Developed] : well developed [General Appearance - Well Nourished] : well nourished [Normal Appearance] : normal appearance [Well Groomed] : well groomed [General Appearance - In No Acute Distress] : no acute distress [Abdomen Soft] : soft [Abdomen Tenderness] : non-tender [Costovertebral Angle Tenderness] : no ~M costovertebral angle tenderness [FreeTextEntry1] : Prostate examined in 2021. [] : no respiratory distress [Respiration, Rhythm And Depth] : normal respiratory rhythm and effort [Exaggerated Use Of Accessory Muscles For Inspiration] : no accessory muscle use

## 2022-10-19 NOTE — LETTER BODY
[Dear  ___] : Dear  [unfilled], [Consult Letter:] : I had the pleasure of evaluating your patient, [unfilled]. [Please see my note below.] : Please see my note below. [FreeTextEntry1] : Address: [address], [place] [address]\par Phone: [phone]

## 2022-10-19 NOTE — ASSESSMENT
[FreeTextEntry1] : Gross hematuria has resolved.  He believes he passed his small 3 mm stone.  He has no flank pain.  Urinary symptoms are stable.  He will continue to remain hydrated.  Residual urine volume is acceptable and the PSA level was 1.6 in April 2022.  He can follow-up in 1 year.\par \par Mikel Dockery MD, FACS\par [facility]\par  of Urology\par \par [address], [address]\[place] [address]\par \par [address], [address]\[place] [address]\par \par Tel: [phone]\par Fax: [phone]

## 2022-11-30 ENCOUNTER — APPOINTMENT (OUTPATIENT)
Dept: ORTHOPEDIC SURGERY | Facility: CLINIC | Age: 67
End: 2022-11-30

## 2022-11-30 PROCEDURE — 99072 ADDL SUPL MATRL&STAF TM PHE: CPT

## 2022-11-30 PROCEDURE — 72100 X-RAY EXAM L-S SPINE 2/3 VWS: CPT

## 2022-11-30 PROCEDURE — 99214 OFFICE O/P EST MOD 30 MIN: CPT

## 2022-11-30 PROCEDURE — 72170 X-RAY EXAM OF PELVIS: CPT

## 2022-11-30 NOTE — HISTORY OF PRESENT ILLNESS
[Lower back] : lower back [6] : 6 [Dull/Aching] : dull/aching [Intermittent] : intermittent [Rest] : rest [Exercising] : exercising [Not working due to injury] : Work status: not working due to injury [de-identified] : WC 8/16/14\par \par 10-4-21- He remains at his baseline level of pain and dysfunction. continues to ambulate with a cane and requesting renewal on the gabapentin\par \par 5/27/22: Here for follow up - symptoms remain - he has been doing cardiac rehab - difficulty rising from a seated position - manages with gabapentin and occasional Percocet - oow\par \par 8/26/22: Here for fu - plan at last was medication - on the cane - back pain remains  - rare use of medication percocet - using gabapentin at night - radiates down the legs as well - down to the feet\par \par had a kidney stone in meantime and had to recover from that \par had a tooth fracture and had to have a procedure from that \par this took him out of the cardiac rehab for a bit but hes back now \par \par \par 11/30/22: here for fu - has been working with PT and working on his gait/ambulatory capacity - hes paying out of pocket for this \par back pain persist \par has some sciatica at times down the legs \par \par Still on the script of percocet we sent in June \par has gabapentin\par \par using cane \par ambulatory capacity \par \par xrays today:\par l spine - scoliosis approx 20, progressive spondylosis versus the xray from 2016\par AP PELVIS - no severe OA  [] : Post Surgical Visit: no [FreeTextEntry1] : back  [FreeTextEntry5] : here for follow up for lower back \par pain is still the same  [FreeTextEntry7] : down into the left thigh  [de-identified] : cardiac rehab which includes lots of walking

## 2022-11-30 NOTE — WORK
[Total] : total [Unknown at this time] : : unknown at this time [Patient] : patient [FreeTextEntry1] : \par The patient cannot return to work

## 2022-11-30 NOTE — DISCUSSION/SUMMARY
[de-identified] : Timing and frequency of medication has been discussed with patient.\par discussion of tx optoins \par cont with cane \par cont with PT\par No change in disability - continue HEP -- has gabapentin and percocet at home - fu in 3 months\par

## 2022-12-13 ENCOUNTER — APPOINTMENT (OUTPATIENT)
Dept: ORTHOPEDIC SURGERY | Facility: CLINIC | Age: 67
End: 2022-12-13

## 2023-01-03 ENCOUNTER — APPOINTMENT (OUTPATIENT)
Dept: ORTHOPEDIC SURGERY | Facility: CLINIC | Age: 68
End: 2023-01-03
Payer: OTHER MISCELLANEOUS

## 2023-01-03 PROCEDURE — 99072 ADDL SUPL MATRL&STAF TM PHE: CPT

## 2023-01-03 PROCEDURE — 99213 OFFICE O/P EST LOW 20 MIN: CPT

## 2023-01-03 NOTE — PHYSICAL EXAM
[Bilateral] : foot and ankle bilaterally [] : no toe tenderness [FreeTextEntry3] : 3+ pitting edema L > R [de-identified] : numb toes [de-identified] : balance

## 2023-01-03 NOTE — HISTORY OF PRESENT ILLNESS
[8] : 8 [7] : 7 [Retired] : Work status: retired [de-identified] :  DOI 8/6/14 \par 1/18/14: Here for f/u on the right foot injury which is a consequencial to the left foot fracture sustained at work. He\par has been doing HEP, he has been OOW.\par 4/3/18 f/u christiano feet HEP, Pt not authorized R foot.\par 6/12/18: f/u B/L feet, HEP. PT is not authorized.\par 9/4/18 f/u christiano feet. HEP, had PT for LS. Not working.\par 11/27/18: f/u bilateral feet L foot having sharp pains. plantar pain occasionally right foot. intermittent. using cane.\par taking gabapentin for the back. was better with PT. Disabled 65%\par 2/5/19: f/u bilateral feet continued pain continued sharp pains to the L foot. more plantar pain to the right foot.\par taking gabapentin for radicular complaints which has been helping. Was better with PT. oow\par 4/9/19 f/u christiano feet Numbness persists L foot plantar pain R somewhat improved PT auth Not working\par 5/21/19 f/u R foot better w/ PT Not working\par 7/2/19: f/u b/l feet. PT/HEP. walking better. Pain to the ball of the right foot/achilles. Sharp pain lateral left foot. usin\par cane. not working\par 9/3/19 f/u christiano feet. Plantar pain resolved HEP\par 11/26/19: f/u bilateral foot, continued numbness to the left foot and bilateral foot pain, awaiting auth for orthotics.\par using cane\par 2/4/20 f/u christiano feet toenail issue L nail. Numbness persists Orthotics pending\par 5/12/20 f/u christiano feet, symptoms persist, Orthotics pending\par 10/20/20 f/u christiano feet Orthotics pending Not working\par 2/23/21: fu bilateral feet, at times stabbing pain to the left lateral foot. right bunion pain at times. not working\par 4/29/21 f/u christiano feet occasional swelling Orthotics not yet approved Not working\par 8/3/21: f/u bilateral feet. using cane. stretching/hep. recent cardiac stent placement. on Plavix and asa. not working\par 11/2/21: fu bilateral feet, doing cardiac rehab. Continued numbness to the left foot. Right medial foot pain continues\par WB with cane. HEP. Hes taking gabapentin for the sciatic and low quantity oxycodone yearly. Not working.\par 2/15/22 f/u christiano feet HEP/PT and cardia rehab\par 5/13/22  f/u christiano feet  HEP  Cardiac Rehab\par 9/16/22: f/u bilateral feet; continued pain; using the cane for balance \par 1/3/23: F/U B/L feet- WB w/ cane. Cardiac rehab  Retired/Disability [] : Post Surgical Visit: no [FreeTextEntry1] : [facility]  [FreeTextEntry3] : 8/6/14 [FreeTextEntry5] : 65 Y/O M RHD M eval SHU Feet WC DOI Above no noticeable change in condition since last visit  [FreeTextEntry6] : Numbness [de-identified] : Home Exercise  [de-identified] :

## 2023-03-06 ENCOUNTER — APPOINTMENT (OUTPATIENT)
Dept: ORTHOPEDIC SURGERY | Facility: CLINIC | Age: 68
End: 2023-03-06
Payer: OTHER MISCELLANEOUS

## 2023-03-06 VITALS — WEIGHT: 243 LBS | BODY MASS INDEX: 40.48 KG/M2 | HEIGHT: 65 IN

## 2023-03-06 PROCEDURE — 99214 OFFICE O/P EST MOD 30 MIN: CPT

## 2023-03-06 PROCEDURE — 99072 ADDL SUPL MATRL&STAF TM PHE: CPT

## 2023-03-06 NOTE — HISTORY OF PRESENT ILLNESS
[Lower back] : lower back [6] : 6 [Dull/Aching] : dull/aching [Intermittent] : intermittent [Rest] : rest [Exercising] : exercising [Not working due to injury] : Work status: not working due to injury [de-identified] : WC 8/16/14\par \par 10-4-21- He remains at his baseline level of pain and dysfunction. continues to ambulate with a cane and requesting renewal on the gabapentin\par \par 5/27/22: Here for follow up - symptoms remain - he has been doing cardiac rehab - difficulty rising from a seated position - manages with gabapentin and occasional Percocet - oow\par \par 8/26/22: Here for fu - plan at last was medication - on the cane - back pain remains  - rare use of medication percocet - using gabapentin at night - radiates down the legs as well - down to the feet\par \par had a kidney stone in meantime and had to recover from that \par had a tooth fracture and had to have a procedure from that \par this took him out of the cardiac rehab for a bit but hes back now \par \par \par 11/30/22: here for fu - has been working with PT and working on his gait/ambulatory capacity - hes paying out of pocket for this \par back pain persist \par has some sciatica at times down the legs \par \par Still on the script of percocet we sent in June \par has gabapentin\par \par using cane \par ambulatory capacity \par \par xrays today:\par l spine - scoliosis approx 20, progressive spondylosis versus the xray from 2016\par AP PELVIS - no severe OA \par \par 3/6/23: here for fu - plan at last was therapy and cane - \par \par doing cardio rehab which helps \par focusing on losing weight at this point  [] : Post Surgical Visit: no [FreeTextEntry1] : back  [FreeTextEntry5] : Pt is here for follow up of lower back. Pain is the same since the last visit.  [FreeTextEntry7] : down into the left thigh, down right thigh [de-identified] : cardiac rehab which includes lots of walking \par PT, HEP, cane

## 2023-03-06 NOTE — DISCUSSION/SUMMARY
[de-identified] : reviewed the case with him \par discussion of tx optoins \par cont with cane and PT \par No change in disability - continue HEP -- has gabapentin and percocet at home - fu in 3 months\par Timing and frequency of medication has been discussed with patient.\par

## 2023-04-04 ENCOUNTER — APPOINTMENT (OUTPATIENT)
Dept: ORTHOPEDIC SURGERY | Facility: CLINIC | Age: 68
End: 2023-04-04
Payer: OTHER MISCELLANEOUS

## 2023-04-04 PROCEDURE — 99213 OFFICE O/P EST LOW 20 MIN: CPT

## 2023-04-04 NOTE — HISTORY OF PRESENT ILLNESS
[Retired] : Work status: retired [6] : 6 [5] : 5 [de-identified] :  DOI 8/6/14 \par 1/18/14: Here for f/u on the right foot injury which is a consequencial to the left foot fracture sustained at work. He\par has been doing HEP, he has been OOW.\par 4/3/18 f/u christiano feet HEP, Pt not authorized R foot.\par 6/12/18: f/u B/L feet, HEP. PT is not authorized.\par 9/4/18 f/u christiano feet. HEP, had PT for LS. Not working.\par 11/27/18: f/u bilateral feet L foot having sharp pains. plantar pain occasionally right foot. intermittent. using cane.\par taking gabapentin for the back. was better with PT. Disabled 65%\par 2/5/19: f/u bilateral feet continued pain continued sharp pains to the L foot. more plantar pain to the right foot.\par taking gabapentin for radicular complaints which has been helping. Was better with PT. oow\par 4/9/19 f/u christiano feet Numbness persists L foot plantar pain R somewhat improved PT auth Not working\par 5/21/19 f/u R foot better w/ PT Not working\par 7/2/19: f/u b/l feet. PT/HEP. walking better. Pain to the ball of the right foot/achilles. Sharp pain lateral left foot. usin\par cane. not working\par 9/3/19 f/u christiano feet. Plantar pain resolved HEP\par 11/26/19: f/u bilateral foot, continued numbness to the left foot and bilateral foot pain, awaiting auth for orthotics.\par using cane\par 2/4/20 f/u christ[facility] feet toenail issue L nail. Numbness persists Orthotics pending\par 5/12/20 f/u christiano feet, symptoms persist, Orthotics pending\par 10/20/20 f/u christiano feet Orthotics pending Not working\par 2/23/21: fu bilateral feet, at times stabbing pain to the left lateral foot. right bunion pain at times. not working\par 4/29/21 f/u christiano feet occasional swelling Orthotics not yet approved Not working\par 8/3/21: f/u bilateral feet. using cane. stretching/hep. recent cardiac stent placement. on Plavix and asa. not working\par 11/2/21: fu bilateral feet, doing cardiac rehab. Continued numbness to the left foot. Right medial foot pain continues\par WB with cane. HEP. Hes taking gabapentin for the sciatic and low quantity oxycodone yearly. Not working.\par 2/15/22 f/u christiano feet HEP/PT and cardia rehab\par 5/13/22  f/u christiano feet  HEP  Cardiac Rehab\par 9/16/22: f/u bilateral feet; continued pain; using the cane for balance \par 1/3/23: F/U B/L feet- WB w/ cane. Cardiac rehab  Retired/Disability\par 4/4/23: F/U B/l Feet-  continued pain. WB with cane. Doing cardiac rehab and attempting to loose weight. Fast pace walking irritates the feet. Swelling to the ankles. Continued numbness to the left foot. Not working  [] : Post Surgical Visit: no [FreeTextEntry1] : SHU Feet  [FreeTextEntry3] : 8/6/14 [FreeTextEntry5] : 66 Y/O M RHD M eval SHU Feet WC DOI Above no noticeable change in condition since last visit  [FreeTextEntry6] : Numbness [de-identified] : Home Exercise  [de-identified] :

## 2023-04-04 NOTE — PHYSICAL EXAM
[Bilateral] : foot and ankle bilaterally [] : no toe tenderness [FreeTextEntry3] : 3+ pitting edema L > R [de-identified] : numb toes [de-identified] : balance

## 2023-04-04 NOTE — DISCUSSION/SUMMARY
[de-identified] : continue HEP; Cane for balance.\par compression stockings\par elevation\par cardio follow-up\par f/u 3 months

## 2023-04-27 ENCOUNTER — APPOINTMENT (OUTPATIENT)
Dept: UROLOGY | Facility: CLINIC | Age: 68
End: 2023-04-27

## 2023-05-26 ENCOUNTER — APPOINTMENT (OUTPATIENT)
Dept: ORTHOPEDIC SURGERY | Facility: CLINIC | Age: 68
End: 2023-05-26
Payer: OTHER MISCELLANEOUS

## 2023-05-26 VITALS — BODY MASS INDEX: 40.65 KG/M2 | WEIGHT: 244 LBS | HEIGHT: 65 IN

## 2023-05-26 DIAGNOSIS — M54.2 CERVICALGIA: ICD-10-CM

## 2023-05-26 PROCEDURE — 99214 OFFICE O/P EST MOD 30 MIN: CPT

## 2023-05-26 NOTE — HISTORY OF PRESENT ILLNESS
[Lower back] : lower back [6] : 6 [Dull/Aching] : dull/aching [Intermittent] : intermittent [Rest] : rest [Exercising] : exercising [Not working due to injury] : Work status: not working due to injury [de-identified] : WC 8/16/14\par \par 10-4-21- He remains at his baseline level of pain and dysfunction. continues to ambulate with a cane and requesting renewal on the gabapentin\par \par 5/27/22: Here for follow up - symptoms remain - he has been doing cardiac rehab - difficulty rising from a seated position - manages with gabapentin and occasional Percocet - oow\par \par 8/26/22: Here for fu - plan at last was medication - on the cane - back pain remains  - rare use of medication percocet - using gabapentin at night - radiates down the legs as well - down to the feet\par \par had a kidney stone in meantime and had to recover from that \par had a tooth fracture and had to have a procedure from that \par this took him out of the cardiac rehab for a bit but hes back now \par \par \par 11/30/22: here for fu - has been working with PT and working on his gait/ambulatory capacity - hes paying out of pocket for this \par back pain persist \par has some sciatica at times down the legs \par \par Still on the script of percocet we sent in June \par has gabapentin\par \par using cane \par ambulatory capacity \par \par xrays today:\par l spine - scoliosis approx 20, progressive spondylosis versus the xray from 2016\par AP PELVIS - no severe OA \par \par 3/6/23: here for fu - plan at last was therapy and cane - \par \par doing cardio rehab which helps \par focusing on losing weight at this point \par \par 5/26/23 Patient continues cardio rehab and admits to feeling stronger in legs which now improved daily activty and stamina.\par continues to take gabapentin.\par Will need refill of opioid today. [] : Post Surgical Visit: no [FreeTextEntry1] : back  [FreeTextEntry5] : Pt is here for follow up of lower back. Pain is the same since the last visit.  [FreeTextEntry7] : down into the left thigh, down right thigh [de-identified] : cardiac rehab which includes lots of walking \par PT, HEP, cane

## 2023-05-26 NOTE — WORK
[Unknown at this time] : : unknown at this time [Patient] : patient [FreeTextEntry1] : \par The patient cannot return to work

## 2023-05-26 NOTE — DISCUSSION/SUMMARY
[de-identified] : cont with cane and PT \par No change in disability - continue HEP -- has gabapentin and percocet at home - fu in 3 months\par Timing and frequency of medication has been discussed with patient. Will renew percocet today\par \par \par

## 2023-07-06 ENCOUNTER — APPOINTMENT (OUTPATIENT)
Dept: ORTHOPEDIC SURGERY | Facility: CLINIC | Age: 68
End: 2023-07-06
Payer: OTHER MISCELLANEOUS

## 2023-07-06 PROCEDURE — 99213 OFFICE O/P EST LOW 20 MIN: CPT

## 2023-07-06 NOTE — HISTORY OF PRESENT ILLNESS
[Retired] : Work status: retired [5] : 5 [de-identified] :  DOI 8/6/14 \par 1/18/14: Here for f/u on the right foot injury which is a consequencial to the left foot fracture sustained at work. He\par has been doing HEP, he has been OOW.\par 4/3/18 f/u christ[facility] feet HEP, Pt not authorized R foot.\par 6/12/18: f/u B/L feet, HEP. PT is not authorized.\par 9/4/18 f/u christiano feet. HEP, had PT for LS. Not working.\par 11/27/18: f/u bilateral feet L foot having sharp pains. plantar pain occasionally right foot. intermittent. using cane.\par taking gabapentin for the back. was better with PT. Disabled 65%\par 2/5/19: f/u bilateral feet continued pain continued sharp pains to the L foot. more plantar pain to the right foot.\par taking gabapentin for radicular complaints which has been helping. Was better with PT. oow\par 4/9/19 f/u christ[facility] feet Numbness persists L foot plantar pain R somewhat improved PT auth Not working\par 5/21/19 f/u R foot better w/ PT Not working\par 7/2/19: f/u b/l feet. PT/HEP. walking better. Pain to the ball of the right foot/achilles. Sharp pain lateral left foot. usin\par cane. not working\par 9/3/19 f/u christiano feet. Plantar pain resolved HEP\par 11/26/19: f/u bilateral foot, continued numbness to the left foot and bilateral foot pain, awaiting auth for orthotics.\par using cane\par 2/4/20 f/u [facility] feet toenail issue L nail. Numbness persists Orthotics pending\par 5/12/20 f/u christiano feet, symptoms persist, Orthotics pending\par 10/20/20 f/u christiano feet Orthotics pending Not working\par 2/23/21: fu bilateral feet, at times stabbing pain to the left lateral foot. right bunion pain at times. not working\par 4/29/21 f/u christiano feet occasional swelling Orthotics not yet approved Not working\par 8/3/21: f/u bilateral feet. using cane. stretching/hep. recent cardiac stent placement. on Plavix and asa. not working\par 11/2/21: fu bilateral feet, doing cardiac rehab. Continued numbness to the left foot. Right medial foot pain continues\par WB with cane. HEP. Hes taking gabapentin for the sciatic and low quantity oxycodone yearly. Not working.\par 2/15/22 f/u christiano feet HEP/PT and cardia rehab\par 5/13/22  f/u christiano feet  HEP  Cardiac Rehab\par 9/16/22: f/u bilateral feet; continued pain; using the cane for balance \par 1/3/23: F/U B/L feet- WB w/ cane. Cardiac rehab  Retired/Disability\par 4/4/23: F/U B/l Feet-  continued pain. WB with cane. Doing cardiac rehab and attempting to loose weight. Fast pace walking irritates the feet. Swelling to the ankles. Continued numbness to the left foot. Not working \par 07/06/2023: f/u B/L feet; States he has been doing a 2 miles walks on a treadmill for cardiac rehab with some burning pain to the feet after activity. WBAT in supportive shoes. Using cane for balance. [] : Post Surgical Visit: no [FreeTextEntry3] : 8/6/14 [FreeTextEntry1] : [facility]et  [FreeTextEntry5] : 68 Y/O M RHD M eval SHU Feet WC DOI Above no noticeable change in condition since last visit  [FreeTextEntry6] : Numbness [de-identified] : Home Exercise  [de-identified] :

## 2023-07-06 NOTE — PHYSICAL EXAM
[Left] : left foot and ankle [Right] : right foot and ankle [NL (20)] : dorsiflexion 20 degrees [NL (40)] : plantar flexion 40 degrees [5___] : plantar flexion 5[unfilled]/5 [2+] : dorsalis pedis pulse: 2+ [] : no achilles tendon insertion tenderness [FreeTextEntry3] : 1+ pitting edema  [de-identified] : numb toes [de-identified] : balance

## 2023-09-08 ENCOUNTER — APPOINTMENT (OUTPATIENT)
Dept: ORTHOPEDIC SURGERY | Facility: CLINIC | Age: 68
End: 2023-09-08
Payer: OTHER MISCELLANEOUS

## 2023-09-08 VITALS — BODY MASS INDEX: 41.32 KG/M2 | HEIGHT: 65 IN | WEIGHT: 248 LBS

## 2023-09-08 DIAGNOSIS — M54.12 RADICULOPATHY, CERVICAL REGION: ICD-10-CM

## 2023-09-08 DIAGNOSIS — M47.812 SPONDYLOSIS W/OUT MYELOPATHY OR RADICULOPATHY, CERVICAL REGION: ICD-10-CM

## 2023-09-08 PROCEDURE — 99214 OFFICE O/P EST MOD 30 MIN: CPT

## 2023-09-08 RX ORDER — OXYCODONE AND ACETAMINOPHEN 5; 325 MG/1; MG/1
5-325 TABLET ORAL
Qty: 40 | Refills: 0 | Status: ACTIVE | COMMUNITY
Start: 2022-05-27 | End: 1900-01-01

## 2023-09-09 NOTE — HISTORY OF PRESENT ILLNESS
[Lower back] : lower back [6] : 6 [Dull/Aching] : dull/aching [Intermittent] : intermittent [Rest] : rest [Exercising] : exercising [Not working due to injury] : Work status: not working due to injury [de-identified] : WC 8/16/14  10-4-21- He remains at his baseline level of pain and dysfunction. continues to ambulate with a cane and requesting renewal on the gabapentin  5/27/22: Here for follow up - symptoms remain - he has been doing cardiac rehab - difficulty rising from a seated position - manages with gabapentin and occasional Percocet - oow  8/26/22: Here for fu - plan at last was medication - on the cane - back pain remains  - rare use of medication percocet - using gabapentin at night - radiates down the legs as well - down to the feet  had a kidney stone in meantime and had to recover from that  had a tooth fracture and had to have a procedure from that  this took him out of the cardiac rehab for a bit but hes back now    11/30/22: here for fu - has been working with PT and working on his gait/ambulatory capacity - hes paying out of pocket for this  back pain persist  has some sciatica at times down the legs   Still on the script of percocet we sent in June  has gabapentin  using cane  ambulatory capacity   xrays today: l spine - scoliosis approx 20, progressive spondylosis versus the xray from 2016 AP PELVIS - no severe OA   3/6/23: here for fu - plan at last was therapy and cane -   doing cardio rehab which helps  focusing on losing weight at this point   5/26/23 Patient continues cardio rehab and admits to feeling stronger in legs which now improved daily activty and stamina. continues to take gabapentin. Will need refill of opioid today.  9/8/23- Here for fu-plan at last was "cont with cane and PT -No change in disability - continue HEP -- has gabapentin and percocet at home - fu in 3 months." Continues to do cardo rehab, which has been helping with his back pain. Continued numbness in L foot; radicular symptoms otherwise significantly improved with gabapentin.  activity remains limited  [] : Post Surgical Visit: no [FreeTextEntry1] : back  [FreeTextEntry5] : Pt is here for follow up of lower back. States condition is similar to last visit. Cardiac rehab is going well but pt feels he has hit a wall.  [FreeTextEntry7] : down into the left thigh, down right thigh [de-identified] : HEP, cane, Percocet

## 2023-09-09 NOTE — DISCUSSION/SUMMARY
[de-identified] : reviewed the case and the most recent imaging with him  discussion of tx options  at this point he will cont with cane and PT  No change in disability - continue HEP -- has gabapentin and percocet at home - fu in 3 months Timing and frequency of medication has been discussed with patient. Will renew percocet today

## 2023-09-11 ENCOUNTER — RX RENEWAL (OUTPATIENT)
Age: 68
End: 2023-09-11

## 2023-09-11 RX ORDER — GABAPENTIN 300 MG/1
300 CAPSULE ORAL
Qty: 180 | Refills: 2 | Status: ACTIVE | COMMUNITY
Start: 2022-03-21 | End: 1900-01-01

## 2023-10-03 ENCOUNTER — APPOINTMENT (OUTPATIENT)
Dept: ORTHOPEDIC SURGERY | Facility: CLINIC | Age: 68
End: 2023-10-03
Payer: OTHER MISCELLANEOUS

## 2023-10-03 PROCEDURE — 99213 OFFICE O/P EST LOW 20 MIN: CPT

## 2023-10-20 ENCOUNTER — APPOINTMENT (OUTPATIENT)
Dept: UROLOGY | Facility: CLINIC | Age: 68
End: 2023-10-20
Payer: MEDICARE

## 2023-10-20 VITALS
OXYGEN SATURATION: 98 % | WEIGHT: 248 LBS | DIASTOLIC BLOOD PRESSURE: 81 MMHG | HEIGHT: 65 IN | BODY MASS INDEX: 41.32 KG/M2 | SYSTOLIC BLOOD PRESSURE: 153 MMHG | HEART RATE: 61 BPM

## 2023-10-20 DIAGNOSIS — N20.0 CALCULUS OF KIDNEY: ICD-10-CM

## 2023-10-20 PROCEDURE — 99213 OFFICE O/P EST LOW 20 MIN: CPT

## 2023-11-07 DIAGNOSIS — R30.0 DYSURIA: ICD-10-CM

## 2023-11-09 LAB
APPEARANCE: CLEAR
BACTERIA: ABNORMAL /HPF
BILIRUBIN URINE: NEGATIVE
BLOOD URINE: ABNORMAL
CAST: 0 /LPF
COLOR: YELLOW
EPITHELIAL CELLS: 1 /HPF
GLUCOSE QUALITATIVE U: NEGATIVE MG/DL
KETONES URINE: NEGATIVE MG/DL
LEUKOCYTE ESTERASE URINE: ABNORMAL
MICROSCOPIC-UA: NORMAL
NITRITE URINE: NEGATIVE
PH URINE: 6
PROTEIN URINE: NEGATIVE MG/DL
RED BLOOD CELLS URINE: 1 /HPF
SPECIFIC GRAVITY URINE: 1.01
UROBILINOGEN URINE: 0.2 MG/DL
WHITE BLOOD CELLS URINE: 30 /HPF

## 2023-11-10 DIAGNOSIS — N39.0 URINARY TRACT INFECTION, SITE NOT SPECIFIED: ICD-10-CM

## 2023-11-10 RX ORDER — SULFAMETHOXAZOLE AND TRIMETHOPRIM 800; 160 MG/1; MG/1
800-160 TABLET ORAL
Qty: 10 | Refills: 0 | Status: ACTIVE | COMMUNITY
Start: 2023-11-10 | End: 1900-01-01

## 2023-11-12 LAB — BACTERIA UR CULT: ABNORMAL

## 2023-11-17 ENCOUNTER — APPOINTMENT (OUTPATIENT)
Dept: ULTRASOUND IMAGING | Facility: CLINIC | Age: 68
End: 2023-11-17
Payer: MEDICARE

## 2023-11-17 ENCOUNTER — OUTPATIENT (OUTPATIENT)
Dept: OUTPATIENT SERVICES | Facility: HOSPITAL | Age: 68
LOS: 1 days | End: 2023-11-17
Payer: MEDICARE

## 2023-11-17 DIAGNOSIS — Z98.89 OTHER SPECIFIED POSTPROCEDURAL STATES: Chronic | ICD-10-CM

## 2023-11-17 DIAGNOSIS — N20.0 CALCULUS OF KIDNEY: ICD-10-CM

## 2023-11-17 PROCEDURE — 76775 US EXAM ABDO BACK WALL LIM: CPT | Mod: 26

## 2023-11-17 PROCEDURE — 76775 US EXAM ABDO BACK WALL LIM: CPT

## 2023-12-15 ENCOUNTER — APPOINTMENT (OUTPATIENT)
Dept: ORTHOPEDIC SURGERY | Facility: CLINIC | Age: 68
End: 2023-12-15
Payer: OTHER MISCELLANEOUS

## 2023-12-15 PROCEDURE — 99213 OFFICE O/P EST LOW 20 MIN: CPT

## 2023-12-15 NOTE — DISCUSSION/SUMMARY
[de-identified] : Rec cont with cane and PT  No change in disability  continue HEP  has gabapentin and percocet at home - med risks discussed fu 3 months

## 2023-12-15 NOTE — HISTORY OF PRESENT ILLNESS
[Lower back] : lower back [Work related] : work related [Rest] : rest [Exercising] : exercising [Not working due to injury] : Work status: not working due to injury [6] : 6 [de-identified] : WC 8/16/14  10-4-21- He remains at his baseline level of pain and dysfunction. continues to ambulate with a cane and requesting renewal on the gabapentin  5/27/22: Here for follow up - symptoms remain - he has been doing cardiac rehab - difficulty rising from a seated position - manages with gabapentin and occasional Percocet - oow  8/26/22: Here for fu - plan at last was medication - on the cane - back pain remains  - rare use of medication percocet - using gabapentin at night - radiates down the legs as well - down to the feet  had a kidney stone in meantime and had to recover from that  had a tooth fracture and had to have a procedure from that  this took him out of the cardiac rehab for a bit but hes back now    11/30/22: here for fu - has been working with PT and working on his gait/ambulatory capacity - hes paying out of pocket for this  back pain persist  has some sciatica at times down the legs   Still on the script of percocet we sent in June  has gabapentin  using cane  ambulatory capacity   xrays today: l spine - scoliosis approx 20, progressive spondylosis versus the xray from 2016 [facility] PELVIS - no severe OA   3/6/23: here for fu - plan at last was therapy and cane -   doing cardio rehab which helps  focusing on losing weight at this point   5/26/23 Patient continues cardio rehab and admits to feeling stronger in legs which now improved daily activty and stamina. continues to take gabapentin. Will need refill of opioid today.  9/8/23- Here for fu-plan at last was "cont with cane and PT -No change in disability - continue HEP -- has gabapentin and percocet at home - fu in 3 months." Continues to do cardo rehab, which has been helping with his back pain. Continued numbness in L foot; radicular symptoms otherwise significantly improved with gabapentin.  activity remains limited   12/15/23: here today to follow up on his lower back. pt states there are no changes in their symptoms since the last visit. still taking gabapentin with relief. Noticed he had increased LE weakness/ fatigue for a few days (potentially related to viral illness), has now resolved.  [] : Post Surgical Visit: no [FreeTextEntry1] : back  [FreeTextEntry3] : 08/16/14 [FreeTextEntry5] : ROSANNE is  [de-identified] : HEP, cane, Percocet

## 2024-01-04 ENCOUNTER — APPOINTMENT (OUTPATIENT)
Dept: ORTHOPEDIC SURGERY | Facility: CLINIC | Age: 69
End: 2024-01-04
Payer: OTHER MISCELLANEOUS

## 2024-01-04 PROCEDURE — 99213 OFFICE O/P EST LOW 20 MIN: CPT

## 2024-01-04 NOTE — DISCUSSION/SUMMARY
[de-identified] : continue HEP; Cane for balance. cardio follow-up/cardiac rehab compression stockings  f/u 3 months

## 2024-01-04 NOTE — PHYSICAL EXAM
[Left] : left foot and ankle [Right] : right foot and ankle [NL (20)] : dorsiflexion 20 degrees [NL (40)] : plantar flexion 40 degrees [5___] : plantar flexion 5[unfilled]/5 [2+] : dorsalis pedis pulse: 2+ [] : no achilles tendon insertion tenderness [de-identified] : numb toes [de-identified] : balance [FreeTextEntry3] : medial foot healing blister and callous formation, no signs of infection

## 2024-01-04 NOTE — WORK
[Partial] : partial [Cannot return to work because ________] : cannot return to work because [unfilled] [No Rx restrictions] : No Rx restrictions. [I provided the services listed above] :  I provided the services listed above. [FreeTextEntry1] : poor No

## 2024-01-04 NOTE — HISTORY OF PRESENT ILLNESS
[6] : 6 [Burning] : burning [Dull/Aching] : dull/aching [Sharp] : sharp [Shooting] : shooting [Throbbing] : throbbing [Retired] : Work status: retired [de-identified] : WC DOI 8/6/14 1/18/14: Here for f/u on the right foot injury which is a consequencial to the left foot fracture sustained at work. He has been doing HEP, he has been OOW. 4/3/18 f/u christiano feet HEP, Pt not authorized R foot. 6/12/18: f/u B/L feet, HEP. PT is not authorized. 9/4/18 f/u christiano feet. HEP, had PT for LS. Not working. 11/27/18: f/u bilateral feet L foot having sharp pains. plantar pain occasionally right foot. intermittent. using cane. taking gabapentin for the back. was better with PT. Disabled 65% 2/5/19: f/u bilateral feet continued pain continued sharp pains to the L foot. more plantar pain to the right foot. taking gabapentin for radicular complaints which has been helping. Was better with PT. oow 4/9/19 f/u christiano feet Numbness persists L foot plantar pain R somewhat improved PT auth Not working 5/21/19 f/u R foot better w/ PT Not working 7/2/19: f/u b/l feet. PT/HEP. walking better. Pain to the ball of the right foot/achilles. Sharp pain lateral left foot. usin cane. not working 9/3/19 f/u christiano feet. Plantar pain resolved HEP 11/26/19: f/u bilateral foot, continued numbness to the left foot and bilateral foot pain, awaiting auth for orthotics. using cane 2/4/20 f/u christiano feet toenail issue L nail. Numbness persists Orthotics pending 5/12/20 f/u christiano feet, symptoms persist, Orthotics pending 10/20/20 f/u christiano feet Orthotics pending Not working 2/23/21: fu bilateral feet, at times stabbing pain to the left lateral foot. right bunion pain at times. not working 4/29/21 f/u christiano feet occasional swelling Orthotics not yet approved Not working 8/3/21: f/u bilateral feet. using cane. stretching/hep. recent cardiac stent placement. on Plavix and asa. not working 11/2/21: fu bilateral feet, doing cardiac rehab. Continued numbness to the left foot. Right medial foot pain continues WB with cane. HEP. Hes taking gabapentin for the sciatic and low quantity oxycodone yearly. Not working. 2/15/22 f/u christiano feet HEP/PT and cardia rehab 5/13/22  f/u christiano feet  HEP  Cardiac Rehab 9/16/22: f/u bilateral feet; continued pain; using the cane for balance  1/3/23: F/U B/L feet- WB w/ cane. Cardiac rehab  Retired/Disability 4/4/23: F/U B/l Feet-  continued pain. WB with cane. Doing cardiac rehab and attempting to loose weight. Fast pace walking irritates the feet. Swelling to the ankles. Continued numbness to the left foot. Not working  07/06/2023: f/u B/L feet; States he has been doing a 2 miles walks on a treadmill for cardiac rehab with some burning pain to the feet after activity. WBAT in supportive shoes. Using cane for balance. 10/03/23: fu on b/l feet using cane for balance. doing cardiac rehab/treadmill, callous/blister right medial foot. + 01/04/24: follow up B/l feet. WB in sneaker and can. Stopped the cardiac rehab but will resume next month  Retired/Disability  Neurontin at [facility] [] : Post Surgical Visit: no [FreeTextEntry1] : SHU Feet  [FreeTextEntry3] : 8/6/14 [FreeTextEntry5] : 66 Y/O M RHD M eval SHU Feet WC DOI Above no noticeable change in condition since last visit  [FreeTextEntry6] : Numbness [de-identified] : Home Exercise  [de-identified] :

## 2024-03-08 ENCOUNTER — APPOINTMENT (OUTPATIENT)
Dept: ORTHOPEDIC SURGERY | Facility: CLINIC | Age: 69
End: 2024-03-08
Payer: OTHER MISCELLANEOUS

## 2024-03-08 DIAGNOSIS — M51.26 OTHER INTERVERTEBRAL DISC DISPLACEMENT, LUMBAR REGION: ICD-10-CM

## 2024-03-08 DIAGNOSIS — M51.9 UNSPECIFIED THORACIC, THORACOLUMBAR AND LUMBOSACRAL INTERVERTEBRAL DISC DISORDER: ICD-10-CM

## 2024-03-08 PROCEDURE — 99212 OFFICE O/P EST SF 10 MIN: CPT | Mod: 1L

## 2024-03-08 PROCEDURE — 99214 OFFICE O/P EST MOD 30 MIN: CPT

## 2024-03-08 NOTE — HISTORY OF PRESENT ILLNESS
[Lower back] : lower back [Work related] : work related [Rest] : rest [6] : 6 [Exercising] : exercising [Not working due to injury] : Work status: not working due to injury [de-identified] :  8/16/14  10-4-21- He remains at his baseline level of pain and dysfunction. continues to ambulate with a cane and requesting renewal on the gabapentin  5/27/22: Here for follow up - symptoms remain - he has been doing cardiac rehab - difficulty rising from a seated position - manages with gabapentin and occasional Percocet - oow  8/26/22: Here for fu - plan at last was medication - on the cane - back pain remains  - rare use of medication percocet - using gabapentin at night - radiates down the legs as well - down to the feet  had a kidney stone in meantime and had to recover from that  had a tooth fracture and had to have a procedure from that  this took him out of the cardiac rehab for a bit but hes back now    11/30/22: here for fu - has been working with PT and working on his gait/ambulatory capacity - hes paying out of pocket for this  back pain persist  has some sciatica at times down the legs   Still on the script of percocet we sent in June  has gabapentin  using cane  ambulatory capacity   xrays today: l spine - scoliosis approx 20, progressive spondylosis versus the xray from 2016 [facility] PELVIS - no severe OA   3/6/23: here for fu - plan at last was therapy and cane -   doing cardio rehab which helps  focusing on losing weight at this point   5/26/23 Patient continues cardio rehab and admits to feeling stronger in legs which now improved daily activty and stamina. continues to take gabapentin. Will need refill of opioid today.  9/8/23- Here for fu-plan at last was "cont with cane and PT -No change in disability - continue HEP -- has gabapentin and percocet at home - fu in 3 months." Continues to do cardo rehab, which has been helping with his back pain. Continued numbness in L foot; radicular symptoms otherwise significantly improved with gabapentin.  activity remains limited   12/15/23: here today to follow up on his lower back. pt states there are no changes in their symptoms since the last visit. still taking gabapentin with relief. Noticed he had increased LE weakness/ fatigue for a few days (potentially related to viral illness), has now resolved.   3/8/24:  follow up visit. continuing HEP. taking Percocet PRN and Gabapentin from home. using cane.  no side effects from the medicatoin  activity limited [FreeTextEntry3] : 08/16/14 [] : Post Surgical Visit: no [FreeTextEntry1] : back  [de-identified] : HEP, cane, Percocet

## 2024-03-08 NOTE — DISCUSSION/SUMMARY
[de-identified] : Rec cont with cane and PT  No change in disability  continue HEP  has gabapentin and percocet at home - reviewed use fu 3 months     [Medication Risks Reviewed] : Medication risks reviewed

## 2024-04-02 ENCOUNTER — APPOINTMENT (OUTPATIENT)
Dept: ORTHOPEDIC SURGERY | Facility: CLINIC | Age: 69
End: 2024-04-02
Payer: OTHER MISCELLANEOUS

## 2024-04-02 DIAGNOSIS — M72.2 PLANTAR FASCIAL FIBROMATOSIS: ICD-10-CM

## 2024-04-02 PROCEDURE — 99213 OFFICE O/P EST LOW 20 MIN: CPT

## 2024-04-02 NOTE — HISTORY OF PRESENT ILLNESS
[6] : 6 [8] : 8 [de-identified] : WC DOI 8/6/14 1/18/14: Here for f/u on the right foot injury which is a consequencial to the left foot fracture sustained at work. He has been doing HEP, he has been OOW. 4/3/18 f/u christiano feet HEP, Pt not authorized R foot. 6/12/18: f/u B/L feet, HEP. PT is not authorized. 9/4/18 f/u christiano feet. HEP, had PT for LS. Not working. 11/27/18: f/u bilateral feet L foot having sharp pains. plantar pain occasionally right foot. intermittent. using cane. taking gabapentin for the back. was better with PT. Disabled 65% 2/5/19: f/u bilateral feet continued pain continued sharp pains to the L foot. more plantar pain to the right foot. taking gabapentin for radicular complaints which has been helping. Was better with PT. oow 4/9/19 f/u christiano feet Numbness persists L foot plantar pain R somewhat improved PT auth Not working 5/21/19 f/u R foot better w/ PT Not working 7/2/19: f/u b/l feet. PT/HEP. walking better. Pain to the ball of the right foot/achilles. Sharp pain lateral left foot. usin cane. not working 9/3/19 f/u christiano feet. Plantar pain resolved HEP 11/26/19: f/u bilateral foot, continued numbness to the left foot and bilateral foot pain, awaiting auth for orthotics. using cane 2/4/20 f/u christiano feet toenail issue L nail. Numbness persists Orthotics pending 5/12/20 f/u christiano feet, symptoms persist, Orthotics pending 10/20/20 f/u christiano feet Orthotics pending Not working 2/23/21: fu bilateral feet, at times stabbing pain to the left lateral foot. right bunion pain at times. not working 4/29/21 f/u christiano feet occasional swelling Orthotics not yet approved Not working 8/3/21: f/u bilateral feet. using cane. stretching/hep. recent cardiac stent placement. on Plavix and asa. not working 11/2/21: fu bilateral feet, doing cardiac rehab. Continued numbness to the left foot. Right medial foot pain continues WB with cane. HEP. Hes taking gabapentin for the sciatic and low quantity oxycodone yearly. Not working. 2/15/22 f/u christiano feet HEP/PT and cardia rehab 5/13/22  f/u christiano feet  HEP  Cardiac Rehab 9/16/22: f/u bilateral feet; continued pain; using the cane for balance  1/3/23: F/U B/L feet- WB w/ cane. Cardiac rehab  Retired/Disability 4/4/23: F/U B/l Feet-  continued pain. WB with cane. Doing cardiac rehab and attempting to loose weight. Fast pace walking irritates the feet. Swelling to the ankles. Continued numbness to the left foot. Not working  07/06/2023: f/u B/L feet; States he has been doing a 2 miles walks on a treadmill for cardiac rehab with some burning pain to the feet after activity. WBAT in supportive shoes. Using cane for balance. 10/03/23: fu on b/l feet using cane for balance. doing cardiac rehab/treadmill, callous/blister right medial foot. + 01/04/24: follow up B/l feet. WB in sneaker and can. Stopped the cardiac rehab but will resume next month  Retired/Disability  Neurontin at [facility] 4/2/24: Here for fu on the left foot; wbat in supportive shoes. Using cane for back and balance.  Not working.

## 2024-04-02 NOTE — DISCUSSION/SUMMARY
[de-identified] : Cane for balance. wbat in supportive shoes  cardio follow-up/cardiac rehab compression stockings  home exercises f/u 3 months

## 2024-04-02 NOTE — PHYSICAL EXAM
[Left] : left foot and ankle [Right] : right foot and ankle [NL (20)] : dorsiflexion 20 degrees [NL (40)] : plantar flexion 40 degrees [5___] : plantar flexion 5[unfilled]/5 [2+] : dorsalis pedis pulse: 2+ [] : no achilles tendon insertion tenderness [de-identified] : numb toes [de-identified] : balance [FreeTextEntry3] : medial foot healing blister and callous formation, no signs of infection

## 2024-06-07 ENCOUNTER — APPOINTMENT (OUTPATIENT)
Dept: ORTHOPEDIC SURGERY | Facility: CLINIC | Age: 69
End: 2024-06-07
Payer: OTHER MISCELLANEOUS

## 2024-06-07 DIAGNOSIS — M54.16 RADICULOPATHY, LUMBAR REGION: ICD-10-CM

## 2024-06-07 PROCEDURE — 99213 OFFICE O/P EST LOW 20 MIN: CPT

## 2024-06-07 NOTE — HISTORY OF PRESENT ILLNESS
[Lower back] : lower back [Work related] : work related [6] : 6 [Rest] : rest [Exercising] : exercising [Not working due to injury] : Work status: not working due to injury [Radiating] : radiating [Intermittent] : intermittent [Nothing helps with pain getting better] : Nothing helps with pain getting better [de-identified] :  8/16/14  10-4-21- He remains at his baseline level of pain and dysfunction. continues to ambulate with a cane and requesting renewal on the gabapentin  5/27/22: Here for follow up - symptoms remain - he has been doing cardiac rehab - difficulty rising from a seated position - manages with gabapentin and occasional Percocet - oow  8/26/22: Here for fu - plan at last was medication - on the cane - back pain remains  - rare use of medication percocet - using gabapentin at night - radiates down the legs as well - down to the feet  had a kidney stone in meantime and had to recover from that  had a tooth fracture and had to have a procedure from that  this took him out of the cardiac rehab for a bit but hes back now    11/30/22: here for fu - has been working with PT and working on his gait/ambulatory capacity - hes paying out of pocket for this  back pain persist  has some sciatica at times down the legs   Still on the script of percocet we sent in June  has gabapentin  using cane  ambulatory capacity   xrays today: l spine - scoliosis approx 20, progressive spondylosis versus the xray from 2016 [facility] PELVIS - no severe OA   3/6/23: here for fu - plan at last was therapy and cane -   doing cardio rehab which helps  focusing on losing weight at this point   5/26/23 Patient continues cardio rehab and admits to feeling stronger in legs which now improved daily activty and stamina. continues to take gabapentin. Will need refill of opioid today.  9/8/23- Here for fu-plan at last was "cont with cane and PT -No change in disability - continue HEP -- has gabapentin and percocet at home - fu in 3 months." Continues to do cardo rehab, which has been helping with his back pain. Continued numbness in L foot; radicular symptoms otherwise significantly improved with gabapentin.  activity remains limited   12/15/23: here today to follow up on his lower back. pt states there are no changes in their symptoms since the last visit. still taking gabapentin with relief. Noticed he had increased LE weakness/ fatigue for a few days (potentially related to viral illness), has now resolved.   3/8/24:  follow up visit. continuing HEP. taking Percocet PRN and Gabapentin from home. using cane.  no side effects from the medicatoin  activity limited  6/7/24  follow up, continues taking percocet prn and gabapentin, no refills needed today. Patient utilizing cane for ambulation.  Activity limited. Patient reports continued cramping in BLE worse in the morning.  [] : Post Surgical Visit: no [FreeTextEntry1] : back  [FreeTextEntry3] : 08/16/14 [FreeTextEntry5] : pt is here for fu on wc L spine, states no change from last visit.  [FreeTextEntry6] : numbness /tingling  [FreeTextEntry7] : RT leg [de-identified] : HEP, cane, Percocet

## 2024-06-07 NOTE — DISCUSSION/SUMMARY
[Medication Risks Reviewed] : Medication risks reviewed [de-identified] : Rec cont with cane and PT  No change in disability  continue HEP  has gabapentin and percocet at home - reviewed use.  Discussed possible increase in gabapentin to managing BLE cramping but patient deferred at this time. No refills needed today, will call when due fu 3 months

## 2024-06-27 ENCOUNTER — APPOINTMENT (OUTPATIENT)
Dept: ORTHOPEDIC SURGERY | Facility: CLINIC | Age: 69
End: 2024-06-27
Payer: OTHER MISCELLANEOUS

## 2024-06-27 DIAGNOSIS — S92.355S NONDISPLACED FRACTURE OF FIFTH METATARSAL BONE, LEFT FOOT, SEQUELA: ICD-10-CM

## 2024-06-27 DIAGNOSIS — M77.42 METATARSALGIA, LEFT FOOT: ICD-10-CM

## 2024-06-27 PROCEDURE — 99203 OFFICE O/P NEW LOW 30 MIN: CPT

## 2024-09-16 ENCOUNTER — APPOINTMENT (OUTPATIENT)
Dept: ORTHOPEDIC SURGERY | Facility: CLINIC | Age: 69
End: 2024-09-16
Payer: OTHER MISCELLANEOUS

## 2024-09-16 DIAGNOSIS — M54.12 RADICULOPATHY, CERVICAL REGION: ICD-10-CM

## 2024-09-16 DIAGNOSIS — M47.812 SPONDYLOSIS W/OUT MYELOPATHY OR RADICULOPATHY, CERVICAL REGION: ICD-10-CM

## 2024-09-16 DIAGNOSIS — M51.26 OTHER INTERVERTEBRAL DISC DISPLACEMENT, LUMBAR REGION: ICD-10-CM

## 2024-09-16 DIAGNOSIS — M54.2 CERVICALGIA: ICD-10-CM

## 2024-09-16 PROCEDURE — 99214 OFFICE O/P EST MOD 30 MIN: CPT

## 2024-09-16 NOTE — DISCUSSION/SUMMARY
[Medication Risks Reviewed] : Medication risks reviewed [de-identified] : Rec cont with cane and PT  No change in disability  continue HEP  has gabapentin and percocet at home - reviewed use. Will renew percocet today Discussed possible increase in gabapentin to managing BLE cramping but patient deferred at this time. No refills needed today, will call when due  fu 3 months

## 2024-09-16 NOTE — HISTORY OF PRESENT ILLNESS
[Lower back] : lower back [Work related] : work related [6] : 6 [Radiating] : radiating [Intermittent] : intermittent [Rest] : rest [Nothing helps with pain getting better] : Nothing helps with pain getting better [Exercising] : exercising [Not working due to injury] : Work status: not working due to injury [de-identified] :  8/16/14  10-4-21- He remains at his baseline level of pain and dysfunction. continues to ambulate with a cane and requesting renewal on the gabapentin  5/27/22: Here for follow up - symptoms remain - he has been doing cardiac rehab - difficulty rising from a seated position - manages with gabapentin and occasional Percocet - oow  8/26/22: Here for fu - plan at last was medication - on the cane - back pain remains  - rare use of medication percocet - using gabapentin at night - radiates down the legs as well - down to the feet  had a kidney stone in meantime and had to recover from that  had a tooth fracture and had to have a procedure from that  this took him out of the cardiac rehab for a bit but hes back now    11/30/22: here for fu - has been working with PT and working on his gait/ambulatory capacity - hes paying out of pocket for this  back pain persist  has some sciatica at times down the legs   Still on the script of percocet we sent in June  has gabapentin  using cane  ambulatory capacity   xrays today: l spine - scoliosis approx 20, progressive spondylosis versus the xray from 2016 [facility] PELVIS - no severe OA   3/6/23: here for fu - plan at last was therapy and cane -   doing cardio rehab which helps  focusing on losing weight at this point   5/26/23 Patient continues cardio rehab and admits to feeling stronger in legs which now improved daily activty and stamina. continues to take gabapentin. Will need refill of opioid today.  9/8/23- Here for fu-plan at last was "cont with cane and PT -No change in disability - continue HEP -- has gabapentin and percocet at home - fu in 3 months." Continues to do cardo rehab, which has been helping with his back pain. Continued numbness in L foot; radicular symptoms otherwise significantly improved with gabapentin.  activity remains limited   12/15/23: here today to follow up on his lower back. pt states there are no changes in their symptoms since the last visit. still taking gabapentin with relief. Noticed he had increased LE weakness/ fatigue for a few days (potentially related to viral illness), has now resolved.   3/8/24:  follow up visit. continuing HEP. taking Percocet PRN and Gabapentin from home. using cane.  no side effects from the medicatoin  activity limited  6/7/24  follow up, continues taking percocet prn and gabapentin, no refills needed today. Patient utilizing cane for ambulation.  Activity limited. Patient reports continued cramping in BLE worse in the morning.   09/16/2024: here for follow up, has stable low back pain and BLE cramping. Using cane for ambulation, activity continues to be limited. Recent Diagnosis of myasthenia gravis started on prednisone 10mg daily. Continues taking percocet prn and gabapentin. [] : Post Surgical Visit: no [FreeTextEntry1] : back  [FreeTextEntry5] : pt is here for fu on wc L spine, states no change from last visit.  [FreeTextEntry3] : 08/16/14 [FreeTextEntry6] : numbness /tingling  [FreeTextEntry7] : RT leg [de-identified] : HEP, cane, Percocet

## 2024-10-01 ENCOUNTER — APPOINTMENT (OUTPATIENT)
Dept: ORTHOPEDIC SURGERY | Facility: CLINIC | Age: 69
End: 2024-10-01
Payer: OTHER MISCELLANEOUS

## 2024-10-01 DIAGNOSIS — M77.42 METATARSALGIA, LEFT FOOT: ICD-10-CM

## 2024-10-01 PROCEDURE — 99213 OFFICE O/P EST LOW 20 MIN: CPT

## 2024-10-01 RX ORDER — PREDNISONE 10 MG/1
10 TABLET ORAL
Refills: 0 | Status: ACTIVE | COMMUNITY

## 2024-10-01 RX ORDER — OMEPRAZOLE 20 MG/1
20 CAPSULE, DELAYED RELEASE ORAL
Refills: 0 | Status: ACTIVE | COMMUNITY

## 2024-10-01 RX ORDER — PYRIDOSTIGMINE BROMIDE 60 MG/1
60 TABLET ORAL
Refills: 0 | Status: ACTIVE | COMMUNITY

## 2024-10-01 NOTE — HISTORY OF PRESENT ILLNESS
[6] : 6 [8] : 8 [de-identified] : WC DOI 8/6/14 1/18/14: Here for f/u on the right foot injury which is a consequencial to the left foot fracture sustained at work. He has been doing HEP, he has been OOW. 4/3/18 f/u christiano feet HEP, Pt not authorized R foot. 6/12/18: f/u B/L feet, HEP. PT is not authorized. 9/4/18 f/u christiano feet. HEP, had PT for LS. Not working. 11/27/18: f/u bilateral feet L foot having sharp pains. plantar pain occasionally right foot. intermittent. using cane. taking gabapentin for the back. was better with PT. Disabled 65% 2/5/19: f/u bilateral feet continued pain continued sharp pains to the L foot. more plantar pain to the right foot. taking gabapentin for radicular complaints which has been helping. Was better with PT. oow 4/9/19 f/u christiano feet Numbness persists L foot plantar pain R somewhat improved PT auth Not working 5/21/19 f/u R foot better w/ PT Not working 7/2/19: f/u b/l feet. PT/HEP. walking better. Pain to the ball of the right foot/achilles. Sharp pain lateral left foot. usin cane. not working 9/3/19 f/u christiano feet. Plantar pain resolved HEP 11/26/19: f/u bilateral foot, continued numbness to the left foot and bilateral foot pain, awaiting auth for orthotics. using cane 2/4/20 f/u christiano feet toenail issue L nail. Numbness persists Orthotics pending 5/12/20 f/u christiano feet, symptoms persist, Orthotics pending 10/20/20 f/u christiano feet Orthotics pending Not working 2/23/21: fu bilateral feet, at times stabbing pain to the left lateral foot. right bunion pain at times. not working 4/29/21 f/u christiano feet occasional swelling Orthotics not yet approved Not working 8/3/21: f/u bilateral feet. using cane. stretching/hep. recent cardiac stent placement. on Plavix and asa. not working 11/2/21: fu bilateral feet, doing cardiac rehab. Continued numbness to the left foot. Right medial foot pain continues WB with cane. HEP. Hes taking gabapentin for the sciatic and low quantity oxycodone yearly. Not working. 2/15/22 f/u christiano feet HEP/PT and cardia rehab 5/13/22  f/u christiano feet  HEP  Cardiac Rehab 9/16/22: f/u bilateral feet; continued pain; using the cane for balance  1/3/23: F/U B/L feet- WB w/ cane. Cardiac rehab  Retired/Disability 4/4/23: F/U B/l Feet-  continued pain. WB with cane. Doing cardiac rehab and attempting to loose weight. Fast pace walking irritates the feet. Swelling to the ankles. Continued numbness to the left foot. Not working  07/06/2023: f/u B/L feet; States he has been doing a 2 miles walks on a treadmill for cardiac rehab with some burning pain to the feet after activity. WBAT in supportive shoes. Using cane for balance. 10/03/23: fu on b/l feet using cane for balance. doing cardiac rehab/treadmill, callous/blister right medial foot. + 01/04/24: follow up B/l feet. WB in sneaker and can. Stopped the cardiac rehab but will resume next month  Retired/Disability  Neurontin at [facility] 4/2/24: Here for fu on the left foot; wbat in supportive shoes. Using cane for back and balance.  Not working.  06/27/2024: follow up on the left foot. Numbness is still the same. WB in sneakers, Not working  10/01/2024 Patient is following up on left foot. States pain has stayed the same. WB in sneakers and cane not working. States he was was diaginosed with MG on steroids.  Takes Vitamin D

## 2024-10-01 NOTE — PHYSICAL EXAM
[Left] : left foot and ankle [Right] : right foot and ankle [NL (20)] : dorsiflexion 20 degrees [NL (40)] : plantar flexion 40 degrees [5___] : plantar flexion 5[unfilled]/5 [2+] : dorsalis pedis pulse: 2+ [] : no achilles tendon insertion tenderness [de-identified] : numb toes [de-identified] : balance [FreeTextEntry3] : medial foot healing blister and callous formation, no signs of infection

## 2024-10-23 ENCOUNTER — APPOINTMENT (OUTPATIENT)
Dept: UROLOGY | Facility: CLINIC | Age: 69
End: 2024-10-23
Payer: MEDICARE

## 2024-10-23 VITALS
BODY MASS INDEX: 42.49 KG/M2 | OXYGEN SATURATION: 99 % | WEIGHT: 255 LBS | DIASTOLIC BLOOD PRESSURE: 76 MMHG | HEART RATE: 63 BPM | SYSTOLIC BLOOD PRESSURE: 156 MMHG | HEIGHT: 65 IN

## 2024-10-23 DIAGNOSIS — N20.0 CALCULUS OF KIDNEY: ICD-10-CM

## 2024-10-23 DIAGNOSIS — R30.0 DYSURIA: ICD-10-CM

## 2024-10-23 PROCEDURE — 99213 OFFICE O/P EST LOW 20 MIN: CPT

## 2024-10-23 PROCEDURE — G2211 COMPLEX E/M VISIT ADD ON: CPT

## 2024-10-23 RX ORDER — OMEPRAZOLE 20 MG/1
20 TABLET, DELAYED RELEASE ORAL
Refills: 0 | Status: ACTIVE | COMMUNITY

## 2024-10-23 RX ORDER — PREDNISONE 10 MG/1
10 TABLET ORAL
Refills: 0 | Status: ACTIVE | COMMUNITY

## 2024-10-23 RX ORDER — PYRIDOSTIGMINE BROMIDE 60 MG/1
60 TABLET ORAL
Refills: 0 | Status: ACTIVE | COMMUNITY

## 2024-10-24 LAB
APPEARANCE: CLEAR
BACTERIA: NEGATIVE /HPF
BILIRUBIN URINE: NEGATIVE
BLOOD URINE: NEGATIVE
CAST: 0 /LPF
COLOR: YELLOW
EPITHELIAL CELLS: 0 /HPF
GLUCOSE QUALITATIVE U: NEGATIVE MG/DL
KETONES URINE: NEGATIVE MG/DL
LEUKOCYTE ESTERASE URINE: NEGATIVE
MICROSCOPIC-UA: NORMAL
NITRITE URINE: NEGATIVE
PH URINE: 5.5
PROTEIN URINE: NEGATIVE MG/DL
RED BLOOD CELLS URINE: 1 /HPF
SPECIFIC GRAVITY URINE: 1.02
UROBILINOGEN URINE: 0.2 MG/DL
WHITE BLOOD CELLS URINE: 0 /HPF

## 2024-10-25 LAB — BACTERIA UR CULT: NORMAL

## 2024-12-16 ENCOUNTER — APPOINTMENT (OUTPATIENT)
Dept: ORTHOPEDIC SURGERY | Facility: CLINIC | Age: 69
End: 2024-12-16
Payer: OTHER MISCELLANEOUS

## 2024-12-16 DIAGNOSIS — M51.26 OTHER INTERVERTEBRAL DISC DISPLACEMENT, LUMBAR REGION: ICD-10-CM

## 2024-12-16 DIAGNOSIS — M54.16 RADICULOPATHY, LUMBAR REGION: ICD-10-CM

## 2024-12-16 DIAGNOSIS — M51.9 UNSPECIFIED THORACIC, THORACOLUMBAR AND LUMBOSACRAL INTERVERTEBRAL DISC DISORDER: ICD-10-CM

## 2024-12-16 PROCEDURE — 99214 OFFICE O/P EST MOD 30 MIN: CPT

## 2024-12-26 ENCOUNTER — NON-APPOINTMENT (OUTPATIENT)
Age: 69
End: 2024-12-26

## 2025-01-07 ENCOUNTER — APPOINTMENT (OUTPATIENT)
Dept: ORTHOPEDIC SURGERY | Facility: CLINIC | Age: 70
End: 2025-01-07
Payer: OTHER MISCELLANEOUS

## 2025-01-07 DIAGNOSIS — M72.2 PLANTAR FASCIAL FIBROMATOSIS: ICD-10-CM

## 2025-01-07 DIAGNOSIS — S92.355S NONDISPLACED FRACTURE OF FIFTH METATARSAL BONE, LEFT FOOT, SEQUELA: ICD-10-CM

## 2025-01-07 DIAGNOSIS — M77.42 METATARSALGIA, LEFT FOOT: ICD-10-CM

## 2025-01-07 DIAGNOSIS — I89.0 LYMPHEDEMA, NOT ELSEWHERE CLASSIFIED: ICD-10-CM

## 2025-01-07 DIAGNOSIS — R60.0 LOCALIZED EDEMA: ICD-10-CM

## 2025-01-07 PROCEDURE — 99213 OFFICE O/P EST LOW 20 MIN: CPT

## 2025-03-03 ENCOUNTER — APPOINTMENT (OUTPATIENT)
Dept: ORTHOPEDIC SURGERY | Facility: CLINIC | Age: 70
End: 2025-03-03
Payer: OTHER MISCELLANEOUS

## 2025-03-03 DIAGNOSIS — M54.16 RADICULOPATHY, LUMBAR REGION: ICD-10-CM

## 2025-03-03 PROCEDURE — 99214 OFFICE O/P EST MOD 30 MIN: CPT

## 2025-04-01 ENCOUNTER — APPOINTMENT (OUTPATIENT)
Dept: ORTHOPEDIC SURGERY | Facility: CLINIC | Age: 70
End: 2025-04-01
Payer: OTHER MISCELLANEOUS

## 2025-04-01 DIAGNOSIS — M77.42 METATARSALGIA, LEFT FOOT: ICD-10-CM

## 2025-04-01 PROCEDURE — 99213 OFFICE O/P EST LOW 20 MIN: CPT

## 2025-06-02 ENCOUNTER — APPOINTMENT (OUTPATIENT)
Dept: ORTHOPEDIC SURGERY | Facility: CLINIC | Age: 70
End: 2025-06-02
Payer: OTHER MISCELLANEOUS

## 2025-06-02 DIAGNOSIS — M54.16 RADICULOPATHY, LUMBAR REGION: ICD-10-CM

## 2025-06-02 PROCEDURE — 99214 OFFICE O/P EST MOD 30 MIN: CPT

## 2025-07-03 ENCOUNTER — APPOINTMENT (OUTPATIENT)
Dept: ORTHOPEDIC SURGERY | Facility: CLINIC | Age: 70
End: 2025-07-03
Payer: OTHER MISCELLANEOUS

## 2025-07-03 PROCEDURE — 99213 OFFICE O/P EST LOW 20 MIN: CPT

## 2025-08-29 ENCOUNTER — APPOINTMENT (OUTPATIENT)
Dept: ORTHOPEDIC SURGERY | Facility: CLINIC | Age: 70
End: 2025-08-29

## 2025-08-29 DIAGNOSIS — M54.16 RADICULOPATHY, LUMBAR REGION: ICD-10-CM

## 2025-08-29 DIAGNOSIS — M51.26 OTHER INTERVERTEBRAL DISC DISPLACEMENT, LUMBAR REGION: ICD-10-CM

## 2025-08-29 DIAGNOSIS — M51.9 UNSPECIFIED THORACIC, THORACOLUMBAR AND LUMBOSACRAL INTERVERTEBRAL DISC DISORDER: ICD-10-CM

## 2025-08-29 PROCEDURE — ZZZZZ: CPT

## 2025-08-29 PROCEDURE — 99214 OFFICE O/P EST MOD 30 MIN: CPT
